# Patient Record
Sex: FEMALE | Race: WHITE | NOT HISPANIC OR LATINO | Employment: FULL TIME | URBAN - METROPOLITAN AREA
[De-identification: names, ages, dates, MRNs, and addresses within clinical notes are randomized per-mention and may not be internally consistent; named-entity substitution may affect disease eponyms.]

---

## 2017-02-21 ENCOUNTER — HOSPITAL ENCOUNTER (OUTPATIENT)
Dept: OTHER | Facility: HOSPITAL | Age: 27
Setting detail: SPECIMEN
Discharge: HOME | End: 2017-02-21
Attending: FAMILY MEDICINE | Admitting: FAMILY MEDICINE

## 2017-02-21 LAB
T3 SERPL-MCNC: 1.17 NG/ML (ref 0.87–1.78)
T4 SERPL-MCNC: 10.27 UG/DL (ref 6.1–12.2)

## 2017-07-15 ENCOUNTER — HOSPITAL ENCOUNTER (OUTPATIENT)
Dept: URGENT CARE | Facility: CLINIC | Age: 27
Discharge: HOME | End: 2017-07-15
Attending: EMERGENCY MEDICINE | Admitting: EMERGENCY MEDICINE

## 2018-08-28 ENCOUNTER — HOSPITAL ENCOUNTER (OUTPATIENT)
Dept: OTHER | Facility: HOSPITAL | Age: 28
Setting detail: SPECIMEN
Discharge: HOME | End: 2018-08-28
Attending: FAMILY MEDICINE | Admitting: FAMILY MEDICINE

## 2018-08-28 LAB
BASOPHILS # BLD AUTO: 0.1 10*3/UL (ref 0–0.2)
BASOPHILS NFR BLD AUTO: 1 % (ref 0–2)
DIFFERENTIAL METHOD BLD: (no result)
EOSINOPHIL # BLD AUTO: 0 % (ref 0–3)
EOSINOPHIL # BLD AUTO: 0 10*3/UL (ref 0–0.3)
ERYTHROCYTE [DISTWIDTH] IN BLOOD BY AUTOMATED COUNT: 14.2 % (ref 11.5–14.5)
HCT VFR BLD AUTO: 37.3 % (ref 35–49)
HGB BLD-MCNC: 12 G/DL (ref 12–15)
LYMPHOCYTES # BLD AUTO: 2 10*3/UL (ref 0.8–4.8)
LYMPHOCYTES NFR BLD AUTO: 25 % (ref 18–42)
MCH RBC QN AUTO: 28 PG (ref 26–32)
MCHC RBC AUTO-ENTMCNC: 32.3 G/DL (ref 32–36)
MCV RBC AUTO: 86.9 FL (ref 80–94)
MONOCYTES # BLD AUTO: 0.5 10*3/UL (ref 0.1–1.3)
MONOCYTES NFR BLD AUTO: 7 % (ref 2–11)
NEUTROPHILS # BLD AUTO: 5.7 10*3/UL (ref 2.3–8.6)
NEUTROPHILS NFR BLD AUTO: 67 % (ref 50–75)
NRBC BLD AUTO-RTO: 0 /100{WBCS}
NRBC/RBC NFR BLD MANUAL: 0 10*3/UL
PLATELET # BLD AUTO: (no result) 10*3/UL (ref 150–450)
PMV BLD AUTO: 9.5 FL (ref 7.4–10.4)
RBC # BLD AUTO: 4.29 10*6/UL (ref 4–5.4)
WBC # BLD AUTO: 8.3 10*3/UL (ref 4.5–11.5)

## 2019-01-21 ENCOUNTER — HOSPITAL ENCOUNTER (OUTPATIENT)
Dept: LAB | Facility: HOSPITAL | Age: 29
Discharge: HOME | End: 2019-01-21
Attending: OBSTETRICS & GYNECOLOGY | Admitting: OBSTETRICS & GYNECOLOGY

## 2019-01-21 LAB
ABO + RH BLD: NORMAL
ARMBAND: NORMAL
BASOPHILS # BLD AUTO: 0.1 10*3/UL (ref 0–0.2)
BASOPHILS NFR BLD AUTO: 1 % (ref 0–2)
BLD COMPONENT TYPE: NORMAL
BLD GP AB SCN SERPL QL: NEGATIVE
CROSSMATCH EXPIRATION: NORMAL
DIFFERENTIAL METHOD BLD: (no result)
EOSINOPHIL # BLD AUTO: 0 % (ref 0–3)
EOSINOPHIL # BLD AUTO: 0 10*3/UL (ref 0–0.3)
ERYTHROCYTE [DISTWIDTH] IN BLOOD BY AUTOMATED COUNT: 15.3 % (ref 11.5–14.5)
HCT VFR BLD AUTO: 39.3 % (ref 35–49)
HGB BLD-MCNC: 12.7 G/DL (ref 12–15)
LYMPHOCYTES # BLD AUTO: 1.8 10*3/UL (ref 0.8–4.8)
LYMPHOCYTES NFR BLD AUTO: 24 % (ref 18–42)
MCH RBC QN AUTO: 27.8 PG (ref 26–32)
MCHC RBC AUTO-ENTMCNC: 32.2 G/DL (ref 32–36)
MCV RBC AUTO: 86.3 FL (ref 80–94)
MONOCYTES # BLD AUTO: 0.4 10*3/UL (ref 0.1–1.3)
MONOCYTES NFR BLD AUTO: 6 % (ref 2–11)
NEUTROPHILS # BLD AUTO: 5.1 10*3/UL (ref 2.3–8.6)
NEUTROPHILS NFR BLD AUTO: 69 % (ref 50–75)
NRBC BLD AUTO-RTO: 0 /100{WBCS}
NRBC/RBC NFR BLD MANUAL: 0 10*3/UL
PLATELET # BLD AUTO: 258 10*3/UL (ref 150–450)
PMV BLD AUTO: 9.4 FL (ref 7.4–10.4)
RBC # BLD AUTO: 4.56 10*6/UL (ref 4–5.4)
WBC # BLD AUTO: 7.3 10*3/UL (ref 4.5–11.5)

## 2019-03-26 ENCOUNTER — HOSPITAL ENCOUNTER (OUTPATIENT)
Dept: OTHER | Facility: HOSPITAL | Age: 29
Setting detail: SPECIMEN
Discharge: HOME | End: 2019-03-26
Attending: FAMILY MEDICINE | Admitting: FAMILY MEDICINE

## 2019-03-26 LAB
ALBUMIN SERPL-MCNC: 4.3 G/DL (ref 3.5–4.8)
ALBUMIN/GLOB SERPL: 1.5 {RATIO} (ref 1–1.7)
ALP SERPL-CCNC: 61 IU/L (ref 32–91)
ALT SERPL-CCNC: 10 IU/L (ref 14–54)
ANION GAP SERPL CALC-SCNC: 14.1 MMOL/L (ref 10–20)
AST SERPL-CCNC: 17 IU/L (ref 15–41)
BASOPHILS # BLD AUTO: 0.1 10*3/UL (ref 0–0.2)
BASOPHILS NFR BLD AUTO: 1 % (ref 0–2)
BILIRUB SERPL-MCNC: 0.3 MG/DL (ref 0.3–1.2)
BUN SERPL-MCNC: 8 MG/DL (ref 8–20)
BUN/CREAT SERPL: 10 (ref 5.4–26.2)
CALCIUM SERPL-MCNC: 9 MG/DL (ref 8.9–10.3)
CHLORIDE SERPL-SCNC: 103 MMOL/L (ref 101–111)
CONV CO2: 24 MMOL/L (ref 22–32)
CONV TOTAL PROTEIN: 7.2 G/DL (ref 6.1–7.9)
CREAT UR-MCNC: 0.8 MG/DL (ref 0.4–1)
DIFFERENTIAL METHOD BLD: (no result)
EOSINOPHIL # BLD AUTO: 0 % (ref 0–3)
EOSINOPHIL # BLD AUTO: 0 10*3/UL (ref 0–0.3)
ERYTHROCYTE [DISTWIDTH] IN BLOOD BY AUTOMATED COUNT: 14.8 % (ref 11.5–14.5)
GLOBULIN UR ELPH-MCNC: 2.9 G/DL (ref 2.5–3.8)
GLUCOSE SERPL-MCNC: 183 MG/DL (ref 65–99)
HCT VFR BLD AUTO: 39.1 % (ref 35–49)
HGB BLD-MCNC: 13.1 G/DL (ref 12–15)
LYMPHOCYTES # BLD AUTO: 1.1 10*3/UL (ref 0.8–4.8)
LYMPHOCYTES NFR BLD AUTO: 16 % (ref 18–42)
MAGNESIUM SERPL-MCNC: 2 MG/DL (ref 1.8–2.5)
MCH RBC QN AUTO: 29 PG (ref 26–32)
MCHC RBC AUTO-ENTMCNC: 33.4 G/DL (ref 32–36)
MCV RBC AUTO: 87 FL (ref 80–94)
MONOCYTES # BLD AUTO: 0.3 10*3/UL (ref 0.1–1.3)
MONOCYTES NFR BLD AUTO: 5 % (ref 2–11)
NEUTROPHILS # BLD AUTO: 5.5 10*3/UL (ref 2.3–8.6)
NEUTROPHILS NFR BLD AUTO: 78 % (ref 50–75)
NRBC BLD AUTO-RTO: 0 /100{WBCS}
NRBC/RBC NFR BLD MANUAL: 0 10*3/UL
PLATELET # BLD AUTO: 260 10*3/UL (ref 150–450)
PMV BLD AUTO: 9.2 FL (ref 7.4–10.4)
POTASSIUM SERPL-SCNC: 3.1 MMOL/L (ref 3.6–5.1)
RBC # BLD AUTO: 4.5 10*6/UL (ref 4–5.4)
SODIUM SERPL-SCNC: 138 MMOL/L (ref 136–144)
WBC # BLD AUTO: 7 10*3/UL (ref 4.5–11.5)

## 2019-05-28 ENCOUNTER — CONVERSION ENCOUNTER (OUTPATIENT)
Dept: OTHER | Facility: HOSPITAL | Age: 29
End: 2019-05-28

## 2019-05-28 ENCOUNTER — HOSPITAL ENCOUNTER (OUTPATIENT)
Dept: URGENT CARE | Facility: CLINIC | Age: 29
Setting detail: SPECIMEN
Discharge: HOME | End: 2019-05-28
Attending: FAMILY MEDICINE | Admitting: FAMILY MEDICINE

## 2019-05-28 LAB
BACTERIA SPEC AEROBE CULT: NORMAL
Lab: NORMAL
MICRO REPORT STATUS: NORMAL
SPECIMEN SOURCE: NORMAL

## 2019-06-04 VITALS
HEART RATE: 78 BPM | SYSTOLIC BLOOD PRESSURE: 107 MMHG | BODY MASS INDEX: 25.51 KG/M2 | WEIGHT: 135 LBS | DIASTOLIC BLOOD PRESSURE: 74 MMHG | RESPIRATION RATE: 20 BRPM | OXYGEN SATURATION: 99 %

## 2019-06-06 NOTE — PROGRESS NOTES
Left flank pain and urinary frequency    28-year-old female presents with complaints of pain in her left flank and urinary frequency.  Symptoms started on Saturday at which time she noticed relatively severe pain in her left flank area.  With that she had nausea but no vomiting.  The pain was   severe for a while and then seemed to ease off.  Since that time she has continued to have pain in her flank but no nausea or vomiting.  The pain has radiated minimally into her lower left side.  No history of renal  Calculi.  No hematuria.    Vital Signs:    Patient Profile:    28 Years Old Female  LMP:        2019  Height:     61 inches  Weight:     135 pounds  BMI:        25.51     O2 Sat:     99 %  Temp:       98.2 degrees F oral  Pulse rate: 78 / minute  Resp:       20 per minute  BP Sittin / 74  (left arm)    Cuff size:  large    Medications: Medications were reviewed with the patient during this visit.  Allergies: Allergies were reviewed with the patient during this visit.  No Known Allergy.    Last MP: 2019      Current Allergies (reviewed today):  No known allergies      Past Medical History:     Reviewed history from 2012 and no changes required:        Hypothyroidism        Arthritis    Past Surgical History:     Reviewed history from 2019 and no changes required:        tubal (2019)    Family History Summary:      Reviewed history Last on 2019 and no changes required:2019  PGF - Has Family History of Heart Disease - Entered On: 2016  PGF - Has Family History of Diabetes - Entered On: 2016  MGM - Has Family History of Heart Disease - Entered On: 2016  MGM - Has Family History of Diabetes - Entered On: 2016    General Comments - FH:  FH-PGM Alzheimers    Social History:     Reviewed history from 07/15/2017 and no changes required:                employed at [employer].                Smoking History:        Patient is a former smoker.         Patient has been counseled to quit.        Risk Factors:     Smoked Tobacco Use:  Former smoker     Cigarettes:  Yes -- 1/2 pack(s) per day,      Year quit:  2015        Years Since Last Quit:  4   Smokeless Tobacco Use:  Never  Passive smoke exposure:  no  Drug use:  no  HIV high-risk behavior:  no  Caffeine use:  0 drinks per day  Alcohol use:  no  Exercise:  no  Seatbelt use:  100 %  Sun Exposure:  occasionally      Review of Systems   General: Denies fevers, chills.   Gastrointestinal: Complains of nausea. Denies vomiting.   Genitourinary: Complains of urinary frequency. Denies incontinence, dysuria, hematuria.         Physical Exam    General:      well developed, well nourished, in no acute distress.    Neck:      no masses, thyromegaly, or abnormal cervical nodes.    Lungs:      clear bilaterally to auscultation.    Heart:      regular rhythm and no murmurs.    Abdomen:        Bowel sounds present, soft, nontender, no HSM or mass, mild left CVA tenderness      Blood Pressure:  Today's BP: 107/74 mm Hg    Labwork:   Most Recent Lab Results:   LDL: 94 mg/dL 02/25/2016        Impression & Recommendations:    Problem # 1:  Flank pain, left (ICD-789.09) ([id number])    Orders:  [facility] V[facility], Est Level III ([id number])   I discussed with this patient her left flank pain.  We discussed the possibility of renal calculi.  Recommended that she strain her urine.  Recommend that she set up an appointment to see her primary care physician.  Recommended for worsening pain to go   to the emergency room for further evaluation.    Other Orders:  Urinalysis Dip Stick/Tablet Rgnt AUTO W/O Jt (44523)  [facility] CULTURE,URINE ([facility])      Patient Instructions:  1)  The exam and treatment that you have received at the Urgent Care has been rendered on an urgent or immediate care basis only and is not intended to to be a substitute for your primary care doctor. It is important that you report any persistant or   worsening problems and see your  physician for follow up care. It is impossible to recognize and treat all elements of an injury or illness in a single visit. If you are unable to contact your physician please call or return to the Urgent Care Center.  2)  Schedule an appointment with  your primary care doctor.  3)  Go to the emergency room for worsening pain.      ]          Laceration/ Wound   Last Tetanus: TDAP (Adacel) WV06.1 (07/15/2017 9:52:31 AM)    Objective     cm  Assessment:     Plan:     Technician: Vanda ospina    Date/Time Collected: May 28, 2019 1:34 PM)  Date/Time Received: May 28, 2019 1:34 PM)  Performed by: ESTHER    Routine Urinalysis          Normal Range   Color:      yellow          Color: Yellow   Appearance:  clear          Appearance: Clear  Leukocytes:  negative       Leukocytes: Negative   Nitrite:         negative       Nitrite: Negative  Protein:     trace mg/dL        Protein:  Negative mg/dL  pH:      6.0        pH:  4.5-8.0  Blood:       negative       Blood:  Negative  Spec. Gravity:   1.030      Spec Gravity:  1.005-1.030  Ketones:     negative mg/dL     Ketones:  Negative mg/dL  Bilirubin:   negative       Bilirubin:  Negative  Glucose:     negative mg/dL     Glucose:  Negative mg/dL                      Electronically signed by Hussein Simmons MD on 05/28/2019 at 1:59 PM  ________________________________________________________________________       Disclaimer: Converted Note message may not contain all data elements that existed in the [employer] source system. Please see [employer] System for the original note details.

## 2019-06-24 ENCOUNTER — OFFICE VISIT (OUTPATIENT)
Dept: FAMILY MEDICINE CLINIC | Facility: CLINIC | Age: 29
End: 2019-06-24

## 2019-06-24 VITALS
WEIGHT: 136 LBS | BODY MASS INDEX: 25.68 KG/M2 | SYSTOLIC BLOOD PRESSURE: 100 MMHG | DIASTOLIC BLOOD PRESSURE: 70 MMHG | HEART RATE: 76 BPM | OXYGEN SATURATION: 98 % | HEIGHT: 61 IN

## 2019-06-24 DIAGNOSIS — E03.8 HYPOTHYROIDISM DUE TO HASHIMOTO'S THYROIDITIS: ICD-10-CM

## 2019-06-24 DIAGNOSIS — R73.9 HYPERGLYCEMIA: ICD-10-CM

## 2019-06-24 DIAGNOSIS — F41.8 ANXIETY WITH DEPRESSION: Primary | ICD-10-CM

## 2019-06-24 DIAGNOSIS — E06.3 HYPOTHYROIDISM DUE TO HASHIMOTO'S THYROIDITIS: ICD-10-CM

## 2019-06-24 PROCEDURE — 99213 OFFICE O/P EST LOW 20 MIN: CPT | Performed by: FAMILY MEDICINE

## 2019-06-24 RX ORDER — BUPROPION HYDROCHLORIDE 150 MG/1
150 TABLET ORAL EVERY MORNING
Qty: 90 TABLET | Refills: 1 | Status: SHIPPED | OUTPATIENT
Start: 2019-06-24 | End: 2020-02-07 | Stop reason: SDUPTHER

## 2019-06-24 RX ORDER — LEVOTHYROXINE SODIUM 0.12 MG/1
125 TABLET ORAL DAILY
Qty: 90 TABLET | Refills: 1 | Status: SHIPPED | OUTPATIENT
Start: 2019-06-24 | End: 2020-02-07

## 2019-06-25 PROBLEM — R53.83 FATIGUE: Status: ACTIVE | Noted: 2018-08-28

## 2019-06-25 PROBLEM — R73.9 HYPERGLYCEMIA: Status: ACTIVE | Noted: 2019-03-27

## 2019-06-25 PROBLEM — E03.9 HYPOTHYROIDISM: Status: ACTIVE | Noted: 2019-06-25

## 2019-06-25 NOTE — PATIENT INSTRUCTIONS
Labs and Meds reviewed.  BS elevation noted. Begin DM Diet and Exercise w Cionnamon 500 mg before am and pm meals.   Sample Freestyle Meter given w test of am and pm BS q.o.d. X 5 and review. Ck A1c w next Lab draw.  Further F/U prn or ion 3 mo.

## 2019-06-25 NOTE — PROGRESS NOTES
"Aryan Campos is a 28 y.o. female.     Pt in for F/U chronic anxiety and depression along w Hypothyroidism and elevation of BS on recent Labs.  FH Pos for DM on both sides.  Some lingering Fatigue noted.     /70 (BP Location: Left arm, Patient Position: Sitting, Cuff Size: Adult)   Pulse 76   Ht 154.9 cm (60.98\")   Wt 61.7 kg (136 lb)   LMP 06/13/2019 (Approximate)   SpO2 98%   BMI 25.71 kg/m²     The following portions of the patient's history were reviewed and updated as appropriate: allergies, current medications, past medical history, past social history and problem list.    Review of Systems   Constitutional: Positive for fatigue.   HENT: Negative.    Eyes: Negative.    Respiratory: Negative.    Cardiovascular: Negative.    Gastrointestinal: Negative.    Endocrine: Negative.         Recent Elevation of BS.   Neurological: Negative.    Psychiatric/Behavioral: Positive for dysphoric mood. The patient is nervous/anxious.         Improved.       Objective   Physical Exam   Constitutional: She is oriented to person, place, and time. She appears well-developed and well-nourished.   HENT:   Head: Atraumatic.   Mouth/Throat: Oropharynx is clear and moist.   Eyes: Conjunctivae and EOM are normal. Pupils are equal, round, and reactive to light.   Neck: No thyromegaly present.   Cardiovascular: Normal rate, regular rhythm, normal heart sounds and intact distal pulses.   No murmur heard.  Pulmonary/Chest: Effort normal and breath sounds normal. No respiratory distress. She has no wheezes. She exhibits no tenderness.   Abdominal: Soft. Bowel sounds are normal. She exhibits no mass. There is no tenderness.   Lymphadenopathy:     She has no cervical adenopathy.   Neurological: She is alert and oriented to person, place, and time. No cranial nerve deficit or sensory deficit. She exhibits normal muscle tone.   Skin: Skin is dry. No rash noted.   Psychiatric: She has a normal mood and affect. Her " behavior is normal. Judgment and thought content normal.   Nursing note and vitals reviewed.      Assessment/Plan  Brigida was seen today for 3 month f/u and med refill.    Diagnoses and all orders for this visit:    Anxiety with depression    Hypothyroidism due to Hashimoto's thyroiditis    Hyperglycemia    Other orders  -     buPROPion XL (WELLBUTRIN XL) 150 MG 24 hr tablet; Take 1 tablet by mouth Every Morning.  -     levothyroxine (SYNTHROID, LEVOTHROID) 125 MCG tablet; Take 1 tablet by mouth Daily.

## 2020-01-07 ENCOUNTER — OFFICE VISIT (OUTPATIENT)
Dept: FAMILY MEDICINE CLINIC | Facility: CLINIC | Age: 30
End: 2020-01-07

## 2020-01-07 VITALS
OXYGEN SATURATION: 97 % | BODY MASS INDEX: 26.62 KG/M2 | SYSTOLIC BLOOD PRESSURE: 111 MMHG | DIASTOLIC BLOOD PRESSURE: 74 MMHG | TEMPERATURE: 98.6 F | WEIGHT: 141 LBS | HEART RATE: 80 BPM | HEIGHT: 61 IN

## 2020-01-07 DIAGNOSIS — B37.31 VAGINAL CANDIDA: ICD-10-CM

## 2020-01-07 DIAGNOSIS — J01.80 OTHER ACUTE SINUSITIS, RECURRENCE NOT SPECIFIED: ICD-10-CM

## 2020-01-07 DIAGNOSIS — J40 BRONCHITIS: Primary | ICD-10-CM

## 2020-01-07 PROCEDURE — 99213 OFFICE O/P EST LOW 20 MIN: CPT | Performed by: NURSE PRACTITIONER

## 2020-01-07 RX ORDER — METHYLPREDNISOLONE 4 MG/1
TABLET ORAL
Qty: 21 TABLET | Refills: 0 | Status: SHIPPED | OUTPATIENT
Start: 2020-01-07 | End: 2020-02-05

## 2020-01-07 RX ORDER — FLUCONAZOLE 150 MG/1
TABLET ORAL
Qty: 5 TABLET | Refills: 0 | Status: SHIPPED | OUTPATIENT
Start: 2020-01-07 | End: 2020-02-05

## 2020-01-07 RX ORDER — AZITHROMYCIN 250 MG/1
TABLET, FILM COATED ORAL
Qty: 6 TABLET | Refills: 0 | Status: SHIPPED | OUTPATIENT
Start: 2020-01-07 | End: 2020-02-05

## 2020-01-07 RX ORDER — BENZONATATE 100 MG/1
100 CAPSULE ORAL 3 TIMES DAILY PRN
Qty: 30 CAPSULE | Refills: 0 | Status: SHIPPED | OUTPATIENT
Start: 2020-01-07 | End: 2020-02-05

## 2020-01-07 NOTE — PROGRESS NOTES
"Brigida Campos is a 29 y.o. female.     Chief Complaint   Patient presents with   • Cough   • Nasal Congestion       History of Present Illness     Patient presents for cough w/ productive yellow sputum, wheezing, nasal congestion, sinus pressure and ear pain/fullness going on for approximately 1.5 weeks.  She reports her 1-year-old daughter had RSV she also developed symptoms, was seen in the [facility] clinic treated symptomatically however her symptoms are now worse and no improvement with over-the-counter medications.  Mucinex fast max and DayQuil cause dizziness and lightheadedness, she had four amoxicillin tablets left from a previous prescription and took those but they did not help, she also complains of vaginal yeast infection.       Subjective     Visit Vitals  /74 (BP Location: Left arm, Patient Position: Sitting, Cuff Size: Adult)   Pulse 80   Temp 98.6 °F (37 °C) (Oral)   Ht 154.9 cm (60.98\")   Wt 64 kg (141 lb)   SpO2 97%   BMI 26.66 kg/m²       The following portions of the patient's history were reviewed and updated as appropriate: allergies, current medications, past family history, past medical history, past social history, past surgical history and problem list.    Review of Systems   Constitutional: Positive for chills and fatigue. Negative for fever.   HENT: Positive for congestion, ear pain, postnasal drip, rhinorrhea, sinus pressure, sneezing and sore throat.    Respiratory: Positive for cough and wheezing. Negative for shortness of breath.    Cardiovascular: Negative for chest pain, palpitations and leg swelling.   Gastrointestinal: Negative.    Musculoskeletal: Negative.    Psychiatric/Behavioral: Negative.        Objective     Physical Exam   Constitutional: She is oriented to person, place, and time. She appears well-developed and well-nourished. She appears ill. No distress.   HENT:   Head: Normocephalic.   Nose: Rhinorrhea and congestion present.   Eyes: Pupils are equal, round, " and reactive to light. Conjunctivae are normal.   Neck: Normal range of motion. Neck supple. No JVD present. No thyromegaly present.   Cardiovascular: Normal rate, regular rhythm and normal heart sounds.   No murmur heard.  Pulmonary/Chest: Effort normal. No respiratory distress. She has wheezes ( With rhonchi throughout). She has no rales.   Abdominal: Soft. Bowel sounds are normal. She exhibits no distension. There is no tenderness.   Musculoskeletal: Normal range of motion. She exhibits no edema or tenderness.   Neurological: She is alert and oriented to person, place, and time. No sensory deficit.   Skin: Skin is warm and dry. No rash noted. She is not diaphoretic. No erythema.   Psychiatric: She has a normal mood and affect. Her behavior is normal. Judgment and thought content normal.         Assessment/Plan   Brigida was seen today for cough and nasal congestion.    Diagnoses and all orders for this visit:    Bronchitis    Other acute sinusitis, recurrence not specified    Vaginal candida    Other orders  -     azithromycin (ZITHROMAX) 250 MG tablet; Take 2 tablets the first day, then 1 tablet daily for 4 days.  -     methylPREDNISolone (MEDROL, LYUDMILA,) 4 MG tablet; Take as directed on package instructions.  -     benzonatate (TESSALON PERLES) 100 MG capsule; Take 1 capsule by mouth 3 (Three) Times a Day As Needed for Cough.  -     fluconazole (DIFLUCAN) 150 MG tablet; Take 1 tablet every 72 hours if needed for yeast infection      Likely post viral infection, Start Z-Lyudmila, Medrol Dosepak and Tessalon Perles.  Diflucan if needed.  Get Mucinex DM over-the-counter avoid products with phenylephrine due to patient's dizziness and lightheadedness.  Push fluids, call or return in 3 to 4 days if signs and symptoms are worse or no better  Return to office in approximately 4 weeks for chronic conditions, lab work and medication refills           Glucose   Date Value Ref Range Status   03/26/2019 183 (H) 65 - 99 mg/dL Final      BUN   Date Value Ref Range Status   03/26/2019 8 8 - 20 mg/dL Final     Creatinine   Date Value Ref Range Status   03/26/2019 0.8 0.4 - 1.0 mg/dl Final     Sodium   Date Value Ref Range Status   03/26/2019 138 136 - 144 mmol/L Final     Potassium   Date Value Ref Range Status   03/26/2019 3.1 (L) 3.6 - 5.1 mmol/L Final     Chloride   Date Value Ref Range Status   03/26/2019 103 101 - 111 mmol/L Final     CO2   Date Value Ref Range Status   03/26/2019 24 22 - 32 mmol/L Final     Calcium   Date Value Ref Range Status   03/26/2019 9.0 8.9 - 10.3 mg/dL Final     Total Protein   Date Value Ref Range Status   03/26/2019 7.2 6.1 - 7.9 g/dL Final     Albumin   Date Value Ref Range Status   03/26/2019 4.3 3.5 - 4.8 g/dL Final     ALT (SGPT)   Date Value Ref Range Status   03/26/2019 10 (L) 14 - 54 IU/L Final     AST (SGOT)   Date Value Ref Range Status   03/26/2019 17 15 - 41 IU/L Final     Alkaline Phosphatase   Date Value Ref Range Status   03/26/2019 61 32 - 91 IU/L Final     Total Bilirubin   Date Value Ref Range Status   03/26/2019 0.3 0.3 - 1.2 mg/dL Final     A/G Ratio   Date Value Ref Range Status   03/26/2019 1.5 1.0 - 1.7 Final     BUN/Creatinine Ratio   Date Value Ref Range Status   03/26/2019 10.0 5.4 - 26.2 Final     Anion Gap   Date Value Ref Range Status   03/26/2019 14.1 10 - 20 Final

## 2020-02-05 ENCOUNTER — OFFICE VISIT (OUTPATIENT)
Dept: FAMILY MEDICINE CLINIC | Facility: CLINIC | Age: 30
End: 2020-02-05

## 2020-02-05 VITALS
WEIGHT: 143 LBS | HEIGHT: 61 IN | OXYGEN SATURATION: 100 % | BODY MASS INDEX: 27 KG/M2 | HEART RATE: 68 BPM | DIASTOLIC BLOOD PRESSURE: 69 MMHG | SYSTOLIC BLOOD PRESSURE: 103 MMHG

## 2020-02-05 DIAGNOSIS — J45.30 MILD PERSISTENT ASTHMATIC BRONCHITIS WITHOUT COMPLICATION: ICD-10-CM

## 2020-02-05 DIAGNOSIS — R73.09 ABNORMAL GLUCOSE: ICD-10-CM

## 2020-02-05 DIAGNOSIS — F41.9 ANXIETY: ICD-10-CM

## 2020-02-05 DIAGNOSIS — F32.A DEPRESSION, UNSPECIFIED DEPRESSION TYPE: ICD-10-CM

## 2020-02-05 DIAGNOSIS — E89.0 POSTABLATIVE HYPOTHYROIDISM: Primary | ICD-10-CM

## 2020-02-05 DIAGNOSIS — Z72.0 TOBACCO USE: ICD-10-CM

## 2020-02-05 PROBLEM — J45.909 ASTHMATIC BRONCHITIS: Status: ACTIVE | Noted: 2020-02-05

## 2020-02-05 PROCEDURE — 84443 ASSAY THYROID STIM HORMONE: CPT | Performed by: NURSE PRACTITIONER

## 2020-02-05 PROCEDURE — 80053 COMPREHEN METABOLIC PANEL: CPT | Performed by: NURSE PRACTITIONER

## 2020-02-05 PROCEDURE — 99214 OFFICE O/P EST MOD 30 MIN: CPT | Performed by: NURSE PRACTITIONER

## 2020-02-05 PROCEDURE — 85027 COMPLETE CBC AUTOMATED: CPT | Performed by: NURSE PRACTITIONER

## 2020-02-05 PROCEDURE — 83036 HEMOGLOBIN GLYCOSYLATED A1C: CPT | Performed by: NURSE PRACTITIONER

## 2020-02-05 PROCEDURE — 80061 LIPID PANEL: CPT | Performed by: NURSE PRACTITIONER

## 2020-02-05 NOTE — PROGRESS NOTES
"  Brigida Campos is a 29 y.o. female.     Chief Complaint   Patient presents with   • Hypothyroidism     Needs thyroid level checked.       History of Present Illness     Hypothyroidism/Graves dz, history of radiation treatment at age 18, now on levothyroxine and stable, same dose 125 for many years.     Asthmatic bronchitis, completed multiple rounds of antibiotics and steroids, condition has improved however still coughing with wheeze and worse at night.  Patient is a smoker but has cut back to only 2 cigarettes/day, with intention to quit in the future    Anxiety, stable on Wellbutrin, does not need refill at this time    Subjective     Visit Vitals  /69 (BP Location: Left arm, Patient Position: Sitting, Cuff Size: Adult)   Pulse 68   Ht 154.9 cm (60.98\")   Wt 64.9 kg (143 lb)   SpO2 100%   BMI 27.03 kg/m²       The following portions of the patient's history were reviewed and updated as appropriate: allergies, current medications, past family history, past medical history, past social history, past surgical history and problem list.    Review of Systems   Constitutional: Negative for chills, fatigue and fever.   HENT: Negative for dental problem, ear pain, sinus pressure and sore throat.    Eyes: Negative for visual disturbance.   Respiratory: Positive for cough and wheezing. Negative for shortness of breath.    Cardiovascular: Negative for chest pain, palpitations and leg swelling.   Gastrointestinal: Negative for abdominal pain, blood in stool, constipation, diarrhea, nausea, vomiting and GERD.   Genitourinary: Negative for difficulty urinating, frequency, urgency and urinary incontinence.   Musculoskeletal: Negative for arthralgias, back pain, gait problem, joint swelling, myalgias and neck pain.   Skin: Negative for dry skin, pallor and rash.   Neurological: Negative for dizziness, seizures, speech difficulty and weakness.   Hematological: Negative for adenopathy.   Psychiatric/Behavioral: " Negative for sleep disturbance, depressed mood and stress. The patient is nervous/anxious.        Objective     Physical Exam   Constitutional: She is oriented to person, place, and time. She appears well-developed and well-nourished. No distress.   HENT:   Head: Normocephalic.   Eyes: Pupils are equal, round, and reactive to light. Conjunctivae are normal.   Neck: Normal range of motion. Neck supple. No JVD present. No thyromegaly present.   Cardiovascular: Normal rate, regular rhythm and normal heart sounds.   No murmur heard.  Pulmonary/Chest: Effort normal. No respiratory distress. She has wheezes.   Tight, dry nonproductive cough   Abdominal: Soft. Bowel sounds are normal. She exhibits no distension. There is no tenderness.   Musculoskeletal: Normal range of motion. She exhibits no edema or tenderness.   Neurological: She is alert and oriented to person, place, and time. No sensory deficit.   Skin: Skin is warm and dry. No rash noted. She is not diaphoretic. No erythema.   Psychiatric: She has a normal mood and affect. Her behavior is normal. Judgment normal.   Nursing note and vitals reviewed.        Assessment/Plan   Brigida was seen today for hypothyroidism.    Diagnoses and all orders for this visit:    Postablative hypothyroidism  -     Comprehensive Metabolic Panel  -     CBC (No Diff)  -     Lipid Panel  -     TSH  Check labs as above, patient does not need levothyroxine refilled at this time.  Will notify patient results    Abnormal glucose  -     Hemoglobin A1c  Last fasting glucose 10 months ago 180, check A1c.  Patient saw endocrinology in the past    Anxiety  Stable on Wellbutrin, refill    Depression, unspecified depression type  Stable, refill Wellbutrin    Tobacco use  Praised efforts at cutting back to only 2 cigarettes/day, continue to cut back and quit    Asthmatic bronchitis  Trial Symbicort inhaler 1 puff twice daily for 30 days, quit smoking      Pap UTD  Declines flu shot.    Encouraged  exercise, walking at least 3 to 4 days/week, patient has already improved diet habits  We will see patient back in 6 months or earlier as needed               Glucose   Date Value Ref Range Status   03/26/2019 183 (H) 65 - 99 mg/dL Final     BUN   Date Value Ref Range Status   03/26/2019 8 8 - 20 mg/dL Final     Creatinine   Date Value Ref Range Status   03/26/2019 0.8 0.4 - 1.0 mg/dl Final     Sodium   Date Value Ref Range Status   03/26/2019 138 136 - 144 mmol/L Final     Potassium   Date Value Ref Range Status   03/26/2019 3.1 (L) 3.6 - 5.1 mmol/L Final     Chloride   Date Value Ref Range Status   03/26/2019 103 101 - 111 mmol/L Final     CO2   Date Value Ref Range Status   03/26/2019 24 22 - 32 mmol/L Final     Calcium   Date Value Ref Range Status   03/26/2019 9.0 8.9 - 10.3 mg/dL Final     Total Protein   Date Value Ref Range Status   03/26/2019 7.2 6.1 - 7.9 g/dL Final     Albumin   Date Value Ref Range Status   03/26/2019 4.3 3.5 - 4.8 g/dL Final     ALT (SGPT)   Date Value Ref Range Status   03/26/2019 10 (L) 14 - 54 IU/L Final     AST (SGOT)   Date Value Ref Range Status   03/26/2019 17 15 - 41 IU/L Final     Alkaline Phosphatase   Date Value Ref Range Status   03/26/2019 61 32 - 91 IU/L Final     Total Bilirubin   Date Value Ref Range Status   03/26/2019 0.3 0.3 - 1.2 mg/dL Final     A/G Ratio   Date Value Ref Range Status   03/26/2019 1.5 1.0 - 1.7 Final     BUN/Creatinine Ratio   Date Value Ref Range Status   03/26/2019 10.0 5.4 - 26.2 Final     Anion Gap   Date Value Ref Range Status   03/26/2019 14.1 10 - 20 Final

## 2020-02-06 LAB
ALBUMIN SERPL-MCNC: 4.2 G/DL (ref 3.5–5.2)
ALBUMIN/GLOB SERPL: 1.4 G/DL
ALP SERPL-CCNC: 47 U/L (ref 39–117)
ALT SERPL W P-5'-P-CCNC: 10 U/L (ref 1–33)
ANION GAP SERPL CALCULATED.3IONS-SCNC: 13.6 MMOL/L (ref 5–15)
AST SERPL-CCNC: 11 U/L (ref 1–32)
BILIRUB SERPL-MCNC: 0.3 MG/DL (ref 0.2–1.2)
BUN BLD-MCNC: 9 MG/DL (ref 6–20)
BUN/CREAT SERPL: 11.5 (ref 7–25)
CALCIUM SPEC-SCNC: 9.5 MG/DL (ref 8.6–10.5)
CHLORIDE SERPL-SCNC: 104 MMOL/L (ref 98–107)
CHOLEST SERPL-MCNC: 167 MG/DL (ref 0–200)
CO2 SERPL-SCNC: 23.4 MMOL/L (ref 22–29)
CREAT BLD-MCNC: 0.78 MG/DL (ref 0.57–1)
DEPRECATED RDW RBC AUTO: 39.4 FL (ref 37–54)
ERYTHROCYTE [DISTWIDTH] IN BLOOD BY AUTOMATED COUNT: 12.2 % (ref 12.3–15.4)
GFR SERPL CREATININE-BSD FRML MDRD: 86 ML/MIN/1.73
GLOBULIN UR ELPH-MCNC: 2.9 GM/DL
GLUCOSE BLD-MCNC: 62 MG/DL (ref 65–99)
HBA1C MFR BLD: 5 % (ref 3.5–5.6)
HCT VFR BLD AUTO: 40.3 % (ref 34–46.6)
HDLC SERPL-MCNC: 50 MG/DL (ref 40–60)
HGB BLD-MCNC: 13.2 G/DL (ref 12–15.9)
LDLC SERPL CALC-MCNC: 100 MG/DL (ref 0–100)
LDLC/HDLC SERPL: 2 {RATIO}
MCH RBC QN AUTO: 28.8 PG (ref 26.6–33)
MCHC RBC AUTO-ENTMCNC: 32.8 G/DL (ref 31.5–35.7)
MCV RBC AUTO: 88 FL (ref 79–97)
PLATELET # BLD AUTO: 289 10*3/MM3 (ref 140–450)
PMV BLD AUTO: 11 FL (ref 6–12)
POTASSIUM BLD-SCNC: 3.8 MMOL/L (ref 3.5–5.2)
PROT SERPL-MCNC: 7.1 G/DL (ref 6–8.5)
RBC # BLD AUTO: 4.58 10*6/MM3 (ref 3.77–5.28)
SODIUM BLD-SCNC: 141 MMOL/L (ref 136–145)
TRIGL SERPL-MCNC: 86 MG/DL (ref 0–150)
TSH SERPL DL<=0.05 MIU/L-ACNC: 6.85 UIU/ML (ref 0.27–4.2)
VLDLC SERPL-MCNC: 17.2 MG/DL (ref 5–40)
WBC NRBC COR # BLD: 8.65 10*3/MM3 (ref 3.4–10.8)

## 2020-02-07 DIAGNOSIS — E89.0 POSTABLATIVE HYPOTHYROIDISM: Primary | ICD-10-CM

## 2020-02-07 RX ORDER — BUPROPION HYDROCHLORIDE 150 MG/1
150 TABLET ORAL EVERY MORNING
Qty: 90 TABLET | Refills: 1 | Status: SHIPPED | OUTPATIENT
Start: 2020-02-07 | End: 2020-06-22 | Stop reason: SDUPTHER

## 2020-02-07 RX ORDER — LEVOTHYROXINE SODIUM 137 UG/1
137 CAPSULE ORAL DAILY
Qty: 30 CAPSULE | Refills: 1 | Status: SHIPPED | OUTPATIENT
Start: 2020-02-07 | End: 2020-02-11

## 2020-02-11 RX ORDER — LEVOTHYROXINE SODIUM 137 UG/1
137 TABLET ORAL DAILY
Qty: 30 TABLET | Refills: 1 | Status: SHIPPED | OUTPATIENT
Start: 2020-02-11 | End: 2020-04-07

## 2020-02-11 NOTE — PROGRESS NOTES
Patient notified of results. She said the brand you sent of the thyroid medication is not covered and cost her $50. She wants to know if you can send the same brand she was getting before- not the Tirosint.

## 2020-03-30 ENCOUNTER — LAB (OUTPATIENT)
Dept: FAMILY MEDICINE CLINIC | Facility: CLINIC | Age: 30
End: 2020-03-30

## 2020-03-30 DIAGNOSIS — E89.0 POSTABLATIVE HYPOTHYROIDISM: ICD-10-CM

## 2020-03-30 PROCEDURE — 84443 ASSAY THYROID STIM HORMONE: CPT | Performed by: NURSE PRACTITIONER

## 2020-03-31 LAB — TSH SERPL DL<=0.05 MIU/L-ACNC: 1.05 UIU/ML (ref 0.27–4.2)

## 2020-04-07 RX ORDER — LEVOTHYROXINE SODIUM 137 UG/1
137 TABLET ORAL DAILY
Qty: 30 TABLET | Refills: 1 | Status: SHIPPED | OUTPATIENT
Start: 2020-04-07 | End: 2020-06-16

## 2020-06-16 RX ORDER — LEVOTHYROXINE SODIUM 137 UG/1
137 TABLET ORAL DAILY
Qty: 30 TABLET | Refills: 1 | Status: SHIPPED | OUTPATIENT
Start: 2020-06-16 | End: 2020-06-22 | Stop reason: SDUPTHER

## 2020-06-22 RX ORDER — BUPROPION HYDROCHLORIDE 150 MG/1
150 TABLET ORAL EVERY MORNING
Qty: 90 TABLET | Refills: 1 | Status: SHIPPED | OUTPATIENT
Start: 2020-06-22 | End: 2021-06-23 | Stop reason: SDUPTHER

## 2020-06-22 RX ORDER — LEVOTHYROXINE SODIUM 137 UG/1
137 TABLET ORAL DAILY
Qty: 90 TABLET | Refills: 1 | Status: SHIPPED | OUTPATIENT
Start: 2020-06-22 | End: 2020-08-28 | Stop reason: SDUPTHER

## 2020-08-28 RX ORDER — LEVOTHYROXINE SODIUM 137 UG/1
137 TABLET ORAL DAILY
Qty: 90 TABLET | Refills: 1 | Status: SHIPPED | OUTPATIENT
Start: 2020-08-28 | End: 2021-04-21

## 2021-04-12 ENCOUNTER — OFFICE VISIT (OUTPATIENT)
Dept: FAMILY MEDICINE CLINIC | Facility: CLINIC | Age: 31
End: 2021-04-12

## 2021-04-12 VITALS
TEMPERATURE: 98.4 F | BODY MASS INDEX: 27.15 KG/M2 | HEART RATE: 75 BPM | HEIGHT: 61 IN | OXYGEN SATURATION: 89 % | WEIGHT: 143.8 LBS | RESPIRATION RATE: 18 BRPM | SYSTOLIC BLOOD PRESSURE: 103 MMHG | DIASTOLIC BLOOD PRESSURE: 68 MMHG

## 2021-04-12 DIAGNOSIS — K58.0 IRRITABLE BOWEL SYNDROME WITH DIARRHEA: ICD-10-CM

## 2021-04-12 DIAGNOSIS — E03.8 HYPOTHYROIDISM DUE TO HASHIMOTO'S THYROIDITIS: ICD-10-CM

## 2021-04-12 DIAGNOSIS — E89.0 POSTABLATIVE HYPOTHYROIDISM: Primary | ICD-10-CM

## 2021-04-12 DIAGNOSIS — E06.3 HYPOTHYROIDISM DUE TO HASHIMOTO'S THYROIDITIS: ICD-10-CM

## 2021-04-12 DIAGNOSIS — R53.82 CHRONIC FATIGUE: ICD-10-CM

## 2021-04-12 DIAGNOSIS — M19.90 ARTHRITIS: ICD-10-CM

## 2021-04-12 PROBLEM — N76.0 VAGINITIS: Status: ACTIVE | Noted: 2021-04-12

## 2021-04-12 PROBLEM — E03.9 HYPOTHYROIDISM: Status: ACTIVE | Noted: 2021-04-12

## 2021-04-12 PROBLEM — R60.9 EDEMA: Status: ACTIVE | Noted: 2021-04-12

## 2021-04-12 PROBLEM — F41.9 ANXIETY: Status: ACTIVE | Noted: 2021-04-12

## 2021-04-12 PROBLEM — J02.9 SORE THROAT: Status: ACTIVE | Noted: 2021-04-12

## 2021-04-12 PROBLEM — B37.9 YEAST INFECTION: Status: ACTIVE | Noted: 2021-04-12

## 2021-04-12 PROBLEM — E05.00 GRAVES' DISEASE: Status: ACTIVE | Noted: 2021-04-12

## 2021-04-12 PROBLEM — J01.90 ACUTE SINUSITIS: Status: ACTIVE | Noted: 2021-04-12

## 2021-04-12 PROBLEM — J06.9 UPPER RESPIRATORY INFECTION: Status: ACTIVE | Noted: 2021-04-12

## 2021-04-12 PROBLEM — M79.10 MYALGIA: Status: ACTIVE | Noted: 2021-04-12

## 2021-04-12 PROBLEM — E66.3 OVERWEIGHT: Status: ACTIVE | Noted: 2021-04-12

## 2021-04-12 PROBLEM — R79.82 CRP ELEVATED: Status: ACTIVE | Noted: 2021-04-12

## 2021-04-12 PROCEDURE — 99204 OFFICE O/P NEW MOD 45 MIN: CPT | Performed by: FAMILY MEDICINE

## 2021-04-12 RX ORDER — DICYCLOMINE HYDROCHLORIDE 10 MG/1
10 CAPSULE ORAL 3 TIMES DAILY PRN
Qty: 90 CAPSULE | Refills: 2 | Status: SHIPPED | OUTPATIENT
Start: 2021-04-12

## 2021-04-12 NOTE — PROGRESS NOTES
"Chief Complaint  Thyroid Problem (patient wants to recheck her thyroid levels)    Subjective    History of Present Illness        Brigida Campos presents to [facility] for   Thyroid Problem  Presents for follow-up visit. Symptoms include anxiety, cold intolerance, fatigue, heat intolerance, palpitations and weight gain. Patient reports no constipation, depressed mood, diaphoresis, diarrhea, dry skin, hair loss, hoarse voice, leg swelling, menstrual problem, nail problem, tremors, visual change or weight loss. (Patient reports her pervious doctor only checked her thyroid levels once and she states she feeling lethargic and wants to recheck her thyroid levels ) The symptoms have been worsening.        Objective   Vital Signs:   Visit Vitals  /68   Pulse 75   Temp 98.4 °F (36.9 °C)   Resp 18   Ht 154.9 cm (61\")   Wt 65.2 kg (143 lb 12.8 oz)   LMP 04/12/2021 (Exact Date)   SpO2 (!) 89%   BMI 27.17 kg/m²       Physical Exam  Vitals reviewed.   Constitutional:       Appearance: She is well-developed.   HENT:      Head: Normocephalic.      Right Ear: External ear normal.      Left Ear: External ear normal.      Nose: Nose normal.   Eyes:      Conjunctiva/sclera: Conjunctivae normal.   Cardiovascular:      Rate and Rhythm: Normal rate and regular rhythm.   Pulmonary:      Effort: Pulmonary effort is normal.      Breath sounds: Normal breath sounds.   Musculoskeletal:         General: Normal range of motion.      Cervical back: Normal range of motion and neck supple.   Skin:     General: Skin is warm and dry.      Capillary Refill: Capillary refill takes less than 2 seconds.   Neurological:      Mental Status: She is alert and oriented to person, place, and time.            Result Review :                    Assessment and Plan      Diagnoses and all orders for this visit:    1. Postablative hypothyroidism (Primary)  Assessment & Plan:  Blood work ordered.  Consider making medication " changes based on blood work.      2. Hypothyroidism due to Hashimoto's thyroiditis  Assessment & Plan:  Blood work ordered.  Consider making medication changes based on blood work.    Orders:  -     CBC & Differential  -     Comprehensive Metabolic Panel  -     T4, Free  -     T4  -     TSH  -     Triiodothyronine (T3) Total & Free    3. Chronic fatigue  Assessment & Plan:  Blood work ordered    Orders:  -     Vitamin D 25 Hydroxy  -     Vitamin B12  -     Folate    4. Irritable bowel syndrome with diarrhea  Assessment & Plan:  This is a 70 dicyclomine help treat her symptoms.  Patient was encouraged to return to clinic if her symptoms do not improve.    Orders:  -     dicyclomine (Bentyl) 10 MG capsule; Take 1 capsule by mouth 3 (Three) Times a Day As Needed (Diarrhea).  Dispense: 90 capsule; Refill: 2    5. Arthritis  Assessment & Plan:  Patient has been started on diclofenac    Orders:  -     diclofenac (VOLTAREN) 50 MG EC tablet; Take 1 tablet by mouth 2 (Two) Times a Day.  Dispense: 60 tablet; Refill: 3    Other orders  -     Cancel: Lipid Panel With / Chol / HDL Ratio           Follow Up   No follow-ups on file.  Patient was given instructions and counseling regarding her condition or for health maintenance advice. Please see specific information pulled into the AVS if appropriate.

## 2021-04-13 PROBLEM — K58.0 IRRITABLE BOWEL SYNDROME WITH DIARRHEA: Status: ACTIVE | Noted: 2021-04-13

## 2021-04-13 LAB
25(OH)D3+25(OH)D2 SERPL-MCNC: 31.4 NG/ML (ref 30–100)
ALBUMIN SERPL-MCNC: 4.4 G/DL (ref 3.8–4.8)
ALBUMIN/GLOB SERPL: 1.6 {RATIO} (ref 1.2–2.2)
ALP SERPL-CCNC: 66 IU/L (ref 39–117)
ALT SERPL-CCNC: 9 IU/L (ref 0–32)
AST SERPL-CCNC: 12 IU/L (ref 0–40)
BASOPHILS # BLD AUTO: 0.1 X10E3/UL (ref 0–0.2)
BASOPHILS NFR BLD AUTO: 1 %
BILIRUB SERPL-MCNC: 0.2 MG/DL (ref 0–1.2)
BUN SERPL-MCNC: 10 MG/DL (ref 6–20)
BUN/CREAT SERPL: 16 (ref 9–23)
CALCIUM SERPL-MCNC: 9.5 MG/DL (ref 8.7–10.2)
CHLORIDE SERPL-SCNC: 106 MMOL/L (ref 96–106)
CO2 SERPL-SCNC: 24 MMOL/L (ref 20–29)
CREAT SERPL-MCNC: 0.64 MG/DL (ref 0.57–1)
EOSINOPHIL # BLD AUTO: 0.1 X10E3/UL (ref 0–0.4)
EOSINOPHIL NFR BLD AUTO: 2 %
ERYTHROCYTE [DISTWIDTH] IN BLOOD BY AUTOMATED COUNT: 11.9 % (ref 11.7–15.4)
FOLATE SERPL-MCNC: 8.5 NG/ML
GLOBULIN SER CALC-MCNC: 2.8 G/DL (ref 1.5–4.5)
GLUCOSE SERPL-MCNC: 76 MG/DL (ref 65–99)
HCT VFR BLD AUTO: 40.1 % (ref 34–46.6)
HGB BLD-MCNC: 13.4 G/DL (ref 11.1–15.9)
IMM GRANULOCYTES # BLD AUTO: 0 X10E3/UL (ref 0–0.1)
IMM GRANULOCYTES NFR BLD AUTO: 0 %
LYMPHOCYTES # BLD AUTO: 1.6 X10E3/UL (ref 0.7–3.1)
LYMPHOCYTES NFR BLD AUTO: 27 %
MCH RBC QN AUTO: 29.5 PG (ref 26.6–33)
MCHC RBC AUTO-ENTMCNC: 33.4 G/DL (ref 31.5–35.7)
MCV RBC AUTO: 88 FL (ref 79–97)
MONOCYTES # BLD AUTO: 0.5 X10E3/UL (ref 0.1–0.9)
MONOCYTES NFR BLD AUTO: 8 %
NEUTROPHILS # BLD AUTO: 3.7 X10E3/UL (ref 1.4–7)
NEUTROPHILS NFR BLD AUTO: 62 %
PLATELET # BLD AUTO: 243 X10E3/UL (ref 150–450)
POTASSIUM SERPL-SCNC: 4.6 MMOL/L (ref 3.5–5.2)
PROT SERPL-MCNC: 7.2 G/DL (ref 6–8.5)
RBC # BLD AUTO: 4.54 X10E6/UL (ref 3.77–5.28)
SODIUM SERPL-SCNC: 142 MMOL/L (ref 134–144)
T3 SERPL-MCNC: 147 NG/DL (ref 71–180)
T3FREE SERPL-MCNC: 3.4 PG/ML (ref 2–4.4)
T4 FREE SERPL-MCNC: 1.89 NG/DL (ref 0.82–1.77)
T4 SERPL-MCNC: 11.6 UG/DL (ref 4.5–12)
TSH SERPL DL<=0.005 MIU/L-ACNC: 0.16 UIU/ML (ref 0.45–4.5)
VIT B12 SERPL-MCNC: 288 PG/ML (ref 232–1245)
WBC # BLD AUTO: 5.9 X10E3/UL (ref 3.4–10.8)

## 2021-04-14 NOTE — ASSESSMENT & PLAN NOTE
This is a 70 dicyclomine help treat her symptoms.  Patient was encouraged to return to clinic if her symptoms do not improve.

## 2021-04-21 ENCOUNTER — TELEPHONE (OUTPATIENT)
Dept: FAMILY MEDICINE CLINIC | Facility: CLINIC | Age: 31
End: 2021-04-21

## 2021-04-21 ENCOUNTER — OFFICE VISIT (OUTPATIENT)
Dept: FAMILY MEDICINE CLINIC | Facility: CLINIC | Age: 31
End: 2021-04-21

## 2021-04-21 VITALS
DIASTOLIC BLOOD PRESSURE: 74 MMHG | HEIGHT: 61 IN | HEART RATE: 75 BPM | RESPIRATION RATE: 18 BRPM | TEMPERATURE: 97.8 F | SYSTOLIC BLOOD PRESSURE: 110 MMHG | BODY MASS INDEX: 27.98 KG/M2 | WEIGHT: 148.2 LBS | OXYGEN SATURATION: 99 %

## 2021-04-21 DIAGNOSIS — E89.0 POSTABLATIVE HYPOTHYROIDISM: Primary | ICD-10-CM

## 2021-04-21 DIAGNOSIS — E05.00 GRAVES' DISEASE: Primary | ICD-10-CM

## 2021-04-21 PROBLEM — Z87.891 HISTORY OF TOBACCO USE: Status: ACTIVE | Noted: 2021-04-21

## 2021-04-21 PROCEDURE — 99213 OFFICE O/P EST LOW 20 MIN: CPT | Performed by: FAMILY MEDICINE

## 2021-04-21 RX ORDER — LEVOTHYROXINE SODIUM 125 MCG
125 TABLET ORAL DAILY
Qty: 90 TABLET | Refills: 1 | Status: SHIPPED | OUTPATIENT
Start: 2021-04-21 | End: 2021-04-23 | Stop reason: CLARIF

## 2021-04-21 RX ORDER — LEVOTHYROXINE SODIUM 125 UG/1
125 CAPSULE ORAL DAILY
Qty: 90 CAPSULE | Refills: 1 | Status: SHIPPED | OUTPATIENT
Start: 2021-04-21 | End: 2021-04-21

## 2021-04-21 NOTE — PROGRESS NOTES
"Chief Complaint  Hypothyroidism    Subjective    History of Present Illness        Brigida Campos presents to [facility] for   Thyroid Problem  Presents for follow-up visit. Symptoms include anxiety, cold intolerance, fatigue, heat intolerance, palpitations and weight gain. Patient reports no constipation, depressed mood, diaphoresis, diarrhea, dry skin, hair loss, hoarse voice, leg swelling, menstrual problem, nail problem, tremors, visual change or weight loss. (Patient reports her pervious doctor only checked her thyroid levels once and she states she feeling lethargic and wants to recheck her thyroid levels   4/21/2021- The patient is here today and following up on the labs that was recently drawn for her thyroid panel. ) The symptoms have been worsening.      Lab review:   4/21/2021- The patient is here today and following up on her labs that was done on 4/12/2021. Her Folate and CMP. Her thyroid panel was off. Her T4 free was 1.89 her t4 total 11.6. Her TSH was elevated at  0.163. her vitamin d was on the lower side of normal at 31.4 and her Vitamin B-12 was 288.    Objective   Vital Signs:   Visit Vitals  /74   Pulse 75   Temp 97.8 °F (36.6 °C)   Resp 18   Ht 154.9 cm (61\")   Wt 67.2 kg (148 lb 3.2 oz)   LMP 04/12/2021 (Exact Date)   SpO2 99%   BMI 28.00 kg/m²       Physical Exam  Vitals reviewed.   Constitutional:       Appearance: She is well-developed.   HENT:      Head: Normocephalic.      Right Ear: External ear normal.      Left Ear: External ear normal.      Nose: Nose normal.   Eyes:      Conjunctiva/sclera: Conjunctivae normal.   Cardiovascular:      Rate and Rhythm: Normal rate and regular rhythm.   Pulmonary:      Effort: Pulmonary effort is normal.      Breath sounds: Normal breath sounds.   Musculoskeletal:         General: Normal range of motion.      Cervical back: Normal range of motion and neck supple.   Skin:     General: Skin is warm and dry.      " Capillary Refill: Capillary refill takes less than 2 seconds.   Neurological:      Mental Status: She is alert and oriented to person, place, and time.            Result Review :      Common labs    Common Labsle 4/12/21 4/12/21    1145 1145   Glucose  76   BUN  10   Creatinine  0.64   eGFR Non  Am  118   eGFR African Am  137   Sodium  142   Potassium  4.6   Chloride  106   Calcium  9.5   Total Protein  7.2   Albumin  4.4   Total Bilirubin  0.2   Alkaline Phosphatase  66   AST (SGOT)  12   ALT (SGPT)  9   WBC 5.9    Hemoglobin 13.4    Hematocrit 40.1    Platelets 243            CMP    CMP 4/12/21   Glucose 76   BUN 10   Creatinine 0.64   eGFR Non  Am 118   eGFR African Am 137   Sodium 142   Potassium 4.6   Chloride 106   Calcium 9.5   Total Protein 7.2   Albumin 4.4   Globulin 2.8   Total Bilirubin 0.2   Alkaline Phosphatase 66   AST (SGOT) 12   ALT (SGPT) 9           CBC    CBC 4/12/21   WBC 5.9   RBC 4.54   Hemoglobin 13.4   Hematocrit 40.1   MCV 88   MCH 29.5   MCHC 33.4   RDW 11.9   Platelets 243           CBC w/diff    CBC w/Diff 4/12/21   WBC 5.9   RBC 4.54   Hemoglobin 13.4   Hematocrit 40.1   MCV 88   MCH 29.5   MCHC 33.4   RDW 11.9   Platelets 243   Neutrophil Rel % 62   Lymphocyte Rel % 27   Monocyte Rel % 8   Eosinophil Rel % 2   Basophil Rel % 1               TSH    TSH 4/12/21   TSH 0.163 (A)   (A) Abnormal value                        Assessment and Plan      Diagnoses and all orders for this visit:    1. Postablative hypothyroidism (Primary)  Assessment & Plan:  Patient's thyroid labs are elevated.  Patient's thyroid medication was decreased to 125 mcg.  Patient was prescribed Synthroid instead of levothyroxine.      Other orders  -     Discontinue: levothyroxine sodium (TIROSINT) 125 MCG capsule capsule; Take 1 capsule by mouth Daily.  Dispense: 90 capsule; Refill: 1           Follow Up   No follow-ups on file.  Patient was given instructions and counseling regarding her condition or  for health maintenance advice. Please see specific information pulled into the AVS if appropriate.

## 2021-04-21 NOTE — TELEPHONE ENCOUNTER
Caller: Brigida Campos    Relationship: Self    Best call back number: [phone]    What medications are you currently taking:   Current Outpatient Medications on File Prior to Visit   Medication Sig Dispense Refill   • buPROPion XL (WELLBUTRIN XL) 150 MG 24 hr tablet Take 1 tablet by mouth Every Morning. 90 tablet 1   • diclofenac (VOLTAREN) 50 MG EC tablet Take 1 tablet by mouth 2 (Two) Times a Day. 60 tablet 3   • dicyclomine (Bentyl) 10 MG capsule Take 1 capsule by mouth 3 (Three) Times a Day As Needed (Diarrhea). 90 capsule 2   • levothyroxine sodium (TIROSINT) 125 MCG capsule capsule Take 1 capsule by mouth Daily. 90 capsule 1   • [DISCONTINUED] levothyroxine (SYNTHROID, LEVOTHROID) 137 MCG tablet Take 1 tablet by mouth Daily. 90 tablet 1     No current facility-administered medications on file prior to visit.        When did you start taking these medications: TODAY    Which medication are you concerned about: levothyroxine sodium (TIROSINT) 125 MCG capsule capsule    Who prescribed you this medication: DR CHO    What are your concerns: INSURANCE WILL NOT COVER THE TIROSINT. PATIENT STATED THE PHARMACY SAID TO WRITE A PRESCRIPTION FOR LEVOTHHYROXINE 125MCG TO BE FILLED.

## 2021-04-22 NOTE — ASSESSMENT & PLAN NOTE
Patient's thyroid labs are elevated.  Patient's thyroid medication was decreased to 125 mcg.  Patient was prescribed Synthroid instead of levothyroxine.

## 2021-04-23 ENCOUNTER — PATIENT MESSAGE (OUTPATIENT)
Dept: FAMILY MEDICINE CLINIC | Facility: CLINIC | Age: 31
End: 2021-04-23

## 2021-04-23 DIAGNOSIS — E05.00 GRAVES' DISEASE: Primary | ICD-10-CM

## 2021-04-23 RX ORDER — LEVOTHYROXINE SODIUM 0.12 MG/1
125 TABLET ORAL DAILY
Qty: 90 TABLET | Refills: 1 | Status: SHIPPED | OUTPATIENT
Start: 2021-04-23 | End: 2021-11-22 | Stop reason: SDUPTHER

## 2021-04-23 NOTE — TELEPHONE ENCOUNTER
From: Brigida Campos  To: Brennon Whyte Sr, MD  Sent: 4/23/2021 8:51 AM EDT  Subject: Prescription Question    Is there anyway you can send levithyroxine script instead of synthroid. My insurance won't cover it. I tried different pharmacy and it's still $112. I can't afford that. Maybe this vitamin b12 and D will help. I do feel better past two days.

## 2021-06-21 RX ORDER — BUPROPION HYDROCHLORIDE 150 MG/1
150 TABLET ORAL EVERY MORNING
Qty: 90 TABLET | Refills: 1 | OUTPATIENT
Start: 2021-06-21

## 2021-06-23 RX ORDER — BUPROPION HYDROCHLORIDE 150 MG/1
150 TABLET ORAL EVERY MORNING
Qty: 90 TABLET | Refills: 1 | OUTPATIENT
Start: 2021-06-23

## 2021-06-24 RX ORDER — BUPROPION HYDROCHLORIDE 150 MG/1
150 TABLET ORAL EVERY MORNING
Qty: 90 TABLET | Refills: 1 | Status: SHIPPED | OUTPATIENT
Start: 2021-06-24 | End: 2021-09-22 | Stop reason: SDUPTHER

## 2021-06-30 ENCOUNTER — OFFICE VISIT (OUTPATIENT)
Dept: FAMILY MEDICINE CLINIC | Facility: CLINIC | Age: 31
End: 2021-06-30

## 2021-06-30 VITALS
OXYGEN SATURATION: 98 % | WEIGHT: 139.4 LBS | RESPIRATION RATE: 18 BRPM | SYSTOLIC BLOOD PRESSURE: 111 MMHG | TEMPERATURE: 98.2 F | DIASTOLIC BLOOD PRESSURE: 75 MMHG | HEIGHT: 61 IN | HEART RATE: 75 BPM | BODY MASS INDEX: 26.32 KG/M2

## 2021-06-30 DIAGNOSIS — Z00.00 PHYSICAL EXAM: Primary | ICD-10-CM

## 2021-06-30 PROCEDURE — 99395 PREV VISIT EST AGE 18-39: CPT | Performed by: FAMILY MEDICINE

## 2021-06-30 NOTE — PROGRESS NOTES
"Chief Complaint  Annual Exam    Subjective    History of Present Illness        Brigida Campos presents to [facility] for   History of Present Illness   Physical exam :   Brigida Campos is a 30 y.o. female here for her annual physical with me. Brigida is here for coordination of medical care, to discuss health maintenance, disease prevention as well as to followup on medical problems. Patient has been followed by me for a while now . Patient's last CPE was unknown. Activity level is moderate. Exercises 4 per week. Appetite is fair. Feels fairly well with few complaints. Energy level is fair. Sleeps fairly well. Patient is doing routine self skin exam monthly. Patient is doing routine self-breast exams she states that she does not perform these.    Objective   Vital Signs:   Visit Vitals  /75   Pulse 75   Temp 98.2 °F (36.8 °C)   Resp 18   Ht 154.9 cm (61\")   Wt 63.2 kg (139 lb 6.4 oz)   SpO2 98%   BMI 26.34 kg/m²       Physical Exam  Vitals reviewed.   Constitutional:       Appearance: She is well-developed.   HENT:      Head: Normocephalic and atraumatic.      Nose: Nose normal.   Eyes:      General: Lids are normal.      Conjunctiva/sclera: Conjunctivae normal.      Pupils: Pupils are equal, round, and reactive to light.   Cardiovascular:      Rate and Rhythm: Normal rate and regular rhythm.      Pulses: Normal pulses.      Heart sounds: Normal heart sounds.   Pulmonary:      Effort: Pulmonary effort is normal. No respiratory distress.      Breath sounds: Normal breath sounds.   Abdominal:      General: Bowel sounds are normal.      Palpations: Abdomen is soft.   Musculoskeletal:         General: Normal range of motion.      Cervical back: Normal range of motion and neck supple.   Skin:     General: Skin is warm and dry.      Capillary Refill: Capillary refill takes less than 2 seconds.   Neurological:      Mental Status: She is alert and oriented to person, place, and time. "   Psychiatric:         Behavior: Behavior normal.         Thought Content: Thought content normal.         Judgment: Judgment normal.            Result Review :                    Assessment and Plan      Diagnoses and all orders for this visit:    1. Physical exam (Primary)  Assessment & Plan:  Discussed injury prevention, diet and exercise, safe sexual practices, and screening for common diseases. Encouraged use of sunscreen and seatbelts. Discussed timing of  cervical cancer screening. Encouraged monthly self-breast exams, yearly clinical breast exams, and discussed timing of mammograms. Avoidance of tobacco encouraged. Limitation or avoidance of alcohol encouraged. Recommend yearly dental and eye exams. Also discussed monitoring of blood pressure, lipids.             Follow Up   No follow-ups on file.  Patient was given instructions and counseling regarding her condition or for health maintenance advice. Please see specific information pulled into the AVS if appropriate.

## 2021-09-22 ENCOUNTER — OFFICE VISIT (OUTPATIENT)
Dept: FAMILY MEDICINE CLINIC | Facility: CLINIC | Age: 31
End: 2021-09-22

## 2021-09-22 VITALS
SYSTOLIC BLOOD PRESSURE: 110 MMHG | DIASTOLIC BLOOD PRESSURE: 66 MMHG | HEART RATE: 69 BPM | OXYGEN SATURATION: 96 % | BODY MASS INDEX: 25.94 KG/M2 | TEMPERATURE: 97.8 F | WEIGHT: 137.4 LBS | RESPIRATION RATE: 16 BRPM | HEIGHT: 61 IN

## 2021-09-22 DIAGNOSIS — F41.9 ANXIETY: Primary | ICD-10-CM

## 2021-09-22 DIAGNOSIS — J06.9 UPPER RESPIRATORY TRACT INFECTION, UNSPECIFIED TYPE: ICD-10-CM

## 2021-09-22 PROCEDURE — 99214 OFFICE O/P EST MOD 30 MIN: CPT | Performed by: FAMILY MEDICINE

## 2021-09-22 RX ORDER — BUPROPION HYDROCHLORIDE 300 MG/1
300 TABLET ORAL EVERY MORNING
Qty: 90 TABLET | Refills: 2 | Status: SHIPPED | OUTPATIENT
Start: 2021-09-22 | End: 2022-01-13 | Stop reason: SDUPTHER

## 2021-09-22 RX ORDER — CIPROFLOXACIN 500 MG/1
500 TABLET, FILM COATED ORAL 2 TIMES DAILY
Qty: 20 TABLET | Refills: 0 | Status: SHIPPED | OUTPATIENT
Start: 2021-09-22 | End: 2021-12-30

## 2021-09-22 RX ORDER — CHLORCYCLIZINE HYDROCHLORIDE AND PSEUDOEPHEDRINE HYDROCHLORIDE 25; 60 MG/1; MG/1
1 TABLET ORAL 2 TIMES DAILY
Qty: 42 TABLET | Refills: 0 | Status: SHIPPED | OUTPATIENT
Start: 2021-09-22 | End: 2021-12-30

## 2021-09-23 DIAGNOSIS — J06.9 UPPER RESPIRATORY TRACT INFECTION, UNSPECIFIED TYPE: Primary | ICD-10-CM

## 2021-09-23 RX ORDER — SULFAMETHOXAZOLE AND TRIMETHOPRIM 800; 160 MG/1; MG/1
1 TABLET ORAL 2 TIMES DAILY
Qty: 14 TABLET | Refills: 0 | Status: SHIPPED | OUTPATIENT
Start: 2021-09-23 | End: 2021-09-30

## 2021-09-24 LAB
LABCORP SARS-COV-2, NAA 2 DAY TAT: NORMAL
SARS-COV-2 RNA RESP QL NAA+PROBE: NOT DETECTED

## 2021-10-04 NOTE — ASSESSMENT & PLAN NOTE
Patient was tested for Covid and results were negative.  she was prescribed Cipro and Stahist to treat her symptoms.    Increase fluids. Tylenol/motrin for pain or fever.   Medication and medication adverse effects discussed.    Follow-up 5-7 days for reevaluation if not improved or sooner if needed.

## 2021-10-04 NOTE — ASSESSMENT & PLAN NOTE
Patient was started on Wellbutrin to help treat her symptoms.  Patient was encouraged to return to clinic in 1 month for follow-up.

## 2021-11-22 DIAGNOSIS — E05.00 GRAVES' DISEASE: ICD-10-CM

## 2021-11-22 RX ORDER — LEVOTHYROXINE SODIUM 0.12 MG/1
125 TABLET ORAL DAILY
Qty: 90 TABLET | Refills: 1 | Status: SHIPPED | OUTPATIENT
Start: 2021-11-22 | End: 2021-12-30

## 2021-12-28 NOTE — PROGRESS NOTES
Chief Complaint   Patient presents with   • Anxiety     follow up   • Hypothyroidism     follow up       History of Present Illness:  Subjective   Brigida Campos is a 31 y.o. female.   Thyroid Problem  Presents for follow-up visit. Symptoms include anxiety, cold intolerance, fatigue, heat intolerance, palpitations and weight gain. Patient reports no constipation, depressed mood, diaphoresis, dry skin, hair loss, hoarse voice, leg swelling, menstrual problem, nail problem, tremors, visual change or weight loss. (Patient reports her pervious doctor only checked her thyroid levels once and she states she feeling lethargic and wants to recheck her thyroid levels   4/21/2021- The patient is here today and following up on the labs that was recently drawn for her thyroid panel. ) The symptoms have been stable.   Anxiety  Presents for follow-up visit. Symptoms include excessive worry, irritability, nervous/anxious behavior, palpitations, panic and restlessness. Patient reports no depressed mood. Symptoms occur most days. Duration: varies. The severity of symptoms is mild and causing significant distress (to severe). The patient sleeps 6 hours per night. The quality of sleep is non-restorative. Nighttime awakenings: several.     Her past medical history is significant for depression. Compliance with medications is %.   Depression  Visit Type: follow-up  Patient presents with the following symptoms: excessive worry, feelings of hopelessness, irritability, nervousness/anxiety, palpitations, panic, restlessness and weight gain.  Patient is not experiencing: depressed mood and weight loss.  Frequency of symptoms: most days   Duration: varies.  Severity: mild (to severe)   Sleep per night: 6 hours  Sleep quality: non-restorative  Nighttime awakenings: several  Compliance with medications:  %             Allergies:  No Known Allergies    Social History:  Social History     Socioeconomic History   • Marital status:     Tobacco Use   • Smoking status: Former Smoker     Packs/day: 1.00     Years: 10.00     Pack years: 10.00     Types: Cigarettes   • Smokeless tobacco: Never Used   Vaping Use   • Vaping Use: Never used   Substance and Sexual Activity   • Alcohol use: No   • Drug use: No   • Sexual activity: Defer       Family History:  Family History   Problem Relation Age of Onset   • Diabetes Maternal Grandmother    • Heart disease Maternal Grandmother    • Heart disease Paternal Grandfather    • Diabetes Paternal Grandfather        Past Medical History :  Active Ambulatory Problems     Diagnosis Date Noted   • Depression 04/12/2016   • Encounter for general adult medical examination without abnormal findings 02/25/2016   • Fatigue 08/28/2018   • History of thyroiditis 02/25/2016   • Hyperglycemia 03/27/2019   • Hypothyroidism 06/25/2019   • Postpartum state 08/28/2018   • Thyroid nodule 02/25/2016   • Sinusitis 07/23/2012   • Asthmatic bronchitis 02/05/2020   • Tobacco use 02/05/2020   • Anxiety 02/05/2020   • Abnormal glucose 02/05/2020   • Arthritis 04/12/2021   • Vaginitis 04/12/2021   • CRP elevated 04/12/2021   • Edema 04/12/2021   • Graves' disease 04/12/2021   • Myalgia 04/12/2021   • Overweight 04/12/2021   • Upper respiratory infection 04/12/2021   • Sore throat 04/12/2021   • Yeast infection 04/12/2021   • Anxiety 04/12/2021   • Hypothyroidism 04/12/2021   • Acute sinusitis 04/12/2021   • Irritable bowel syndrome with diarrhea 04/13/2021   • History of tobacco use 04/21/2021   • Physical exam 06/30/2021     Resolved Ambulatory Problems     Diagnosis Date Noted   • No Resolved Ambulatory Problems     No Additional Past Medical History       Medication List:  Outpatient Encounter Medications as of 12/30/2021   Medication Sig Dispense Refill   • buPROPion XL (WELLBUTRIN XL) 300 MG 24 hr tablet Take 1 tablet by mouth Every Morning. 90 tablet 2   • diclofenac (VOLTAREN) 50 MG EC tablet Take 1 tablet by mouth 2  "(Two) Times a Day. 60 tablet 3   • dicyclomine (Bentyl) 10 MG capsule Take 1 capsule by mouth 3 (Three) Times a Day As Needed (Diarrhea). 90 capsule 2   • [DISCONTINUED] levothyroxine (SYNTHROID, LEVOTHROID) 125 MCG tablet Take 1 tablet by mouth Daily. 90 tablet 1   • Synthroid 125 MCG tablet Take 1 tablet by mouth Daily. 90 tablet 2   • [DISCONTINUED] Chlorcyclizine-Pseudoephed (Stahist AD) 25-60 MG tablet Take 1 tablet by mouth 2 (two) times a day. 42 tablet 0   • [DISCONTINUED] ciprofloxacin (CIPRO) 500 MG tablet Take 1 tablet by mouth 2 (Two) Times a Day. 20 tablet 0     No facility-administered encounter medications on file as of 12/30/2021.       Past Surgical History:  Past Surgical History:   Procedure Laterality Date   • OTHER SURGICAL HISTORY  01/2019    Tubal    • TUBAL ABDOMINAL LIGATION          The following portions of the patient's history were reviewed and updated as appropriate: allergies, current medications, past family history, past medical history, past social history, past surgical history and problem list.    Review Of Systems:  Review of Systems   Constitutional: Positive for fatigue, irritability and unexpected weight gain. Negative for diaphoresis and unexpected weight loss.   HENT: Negative for hoarse voice.    Cardiovascular: Positive for palpitations.   Gastrointestinal: Negative for constipation.   Endocrine: Positive for cold intolerance and heat intolerance.   Genitourinary: Negative for menstrual problem.   Skin: Negative for dry skin.   Neurological: Negative for tremors.   Psychiatric/Behavioral: Negative for depressed mood. The patient is nervous/anxious.        Objective     Physical Exam:  Vital Signs:  Visit Vitals  /60 (BP Location: Right arm, Patient Position: Sitting, Cuff Size: Adult)   Pulse 70   Temp 97.5 °F (36.4 °C) (Skin)   Resp 16   Ht 154.9 cm (61\")   Wt 67.4 kg (148 lb 9.6 oz)   SpO2 97%   BMI 28.08 kg/m²       Physical Exam  Vitals reviewed. "   Constitutional:       Appearance: She is well-developed.   HENT:      Head: Normocephalic.      Right Ear: External ear normal.      Left Ear: External ear normal.      Nose: Nose normal.   Eyes:      Conjunctiva/sclera: Conjunctivae normal.   Cardiovascular:      Rate and Rhythm: Normal rate and regular rhythm.   Pulmonary:      Effort: Pulmonary effort is normal.      Breath sounds: Normal breath sounds.   Musculoskeletal:         General: Normal range of motion.      Cervical back: Normal range of motion and neck supple.   Skin:     General: Skin is warm and dry.      Capillary Refill: Capillary refill takes less than 2 seconds.   Neurological:      Mental Status: She is alert and oriented to person, place, and time.           Assessment/Plan   Assessment and Plan:  Diagnoses and all orders for this visit:    1. Moderate episode of recurrent major depressive disorder (HCC) (Primary)  Assessment & Plan:  Patient's depression is recurrent and is moderate without psychosis. Their depression is currently active and the condition is unchanged. This will be reassessed at the next regular appointment. F/U as described:patient was prescribed an antidepressant medicine and patient depression is being managed by their pcp.      2. Anxiety  Assessment & Plan:  She was advised to meditate, exercise and continue the medications.      3. Postablative hypothyroidism  Assessment & Plan:  She was given a prescription of Synthroid (trade name) to help with her symptoms.   She was encouraged to RTC in 3 months.      Orders:  -     TSH  -     T4  -     T4, free  -     T3, Free  -     Synthroid 125 MCG tablet; Take 1 tablet by mouth Daily.  Dispense: 90 tablet; Refill: 2    4. Chronic fatigue  -     CBC w AUTO Differential    5. Screening for hyperlipidemia  -     Comprehensive metabolic panel  -     Lipid Panel With / Chol / HDL Ratio

## 2021-12-30 ENCOUNTER — OFFICE VISIT (OUTPATIENT)
Dept: FAMILY MEDICINE CLINIC | Facility: CLINIC | Age: 31
End: 2021-12-30

## 2021-12-30 VITALS
HEIGHT: 61 IN | SYSTOLIC BLOOD PRESSURE: 114 MMHG | BODY MASS INDEX: 28.05 KG/M2 | HEART RATE: 70 BPM | OXYGEN SATURATION: 97 % | TEMPERATURE: 97.5 F | RESPIRATION RATE: 16 BRPM | DIASTOLIC BLOOD PRESSURE: 60 MMHG | WEIGHT: 148.6 LBS

## 2021-12-30 DIAGNOSIS — F41.9 ANXIETY: ICD-10-CM

## 2021-12-30 DIAGNOSIS — F33.1 MODERATE EPISODE OF RECURRENT MAJOR DEPRESSIVE DISORDER (HCC): Primary | ICD-10-CM

## 2021-12-30 DIAGNOSIS — Z13.220 SCREENING FOR HYPERLIPIDEMIA: ICD-10-CM

## 2021-12-30 DIAGNOSIS — R53.82 CHRONIC FATIGUE: ICD-10-CM

## 2021-12-30 DIAGNOSIS — E89.0 POSTABLATIVE HYPOTHYROIDISM: ICD-10-CM

## 2021-12-30 PROCEDURE — 99214 OFFICE O/P EST MOD 30 MIN: CPT | Performed by: FAMILY MEDICINE

## 2021-12-30 RX ORDER — LEVOTHYROXINE SODIUM 125 MCG
125 TABLET ORAL DAILY
Qty: 90 TABLET | Refills: 2 | Status: SHIPPED | OUTPATIENT
Start: 2021-12-30 | End: 2022-03-24

## 2021-12-30 NOTE — ASSESSMENT & PLAN NOTE
Patient's depression is recurrent and is moderate without psychosis. Their depression is currently active and the condition is unchanged. This will be reassessed at the next regular appointment. F/U as described:patient was prescribed an antidepressant medicine and patient depression is being managed by their pcp.

## 2021-12-30 NOTE — ASSESSMENT & PLAN NOTE
She was given a prescription of Synthroid (trade name) to help with her symptoms.   She was encouraged to RTC in 3 months.

## 2022-01-06 ENCOUNTER — OFFICE VISIT (OUTPATIENT)
Dept: FAMILY MEDICINE CLINIC | Facility: CLINIC | Age: 32
End: 2022-01-06

## 2022-01-06 VITALS
HEIGHT: 61 IN | RESPIRATION RATE: 17 BRPM | SYSTOLIC BLOOD PRESSURE: 132 MMHG | HEART RATE: 94 BPM | TEMPERATURE: 98.1 F | BODY MASS INDEX: 28.08 KG/M2 | OXYGEN SATURATION: 96 % | DIASTOLIC BLOOD PRESSURE: 92 MMHG

## 2022-01-06 DIAGNOSIS — J06.9 VIRAL UPPER RESPIRATORY TRACT INFECTION: Primary | ICD-10-CM

## 2022-01-06 PROCEDURE — 99213 OFFICE O/P EST LOW 20 MIN: CPT | Performed by: FAMILY MEDICINE

## 2022-01-06 RX ORDER — ALBUTEROL SULFATE 90 UG/1
2 AEROSOL, METERED RESPIRATORY (INHALATION) EVERY 4 HOURS PRN
Qty: 18 G | Refills: 2 | Status: SHIPPED | OUTPATIENT
Start: 2022-01-06 | End: 2022-01-07 | Stop reason: SDUPTHER

## 2022-01-06 RX ORDER — AMOXICILLIN AND CLAVULANATE POTASSIUM 500; 125 MG/1; MG/1
1 TABLET, FILM COATED ORAL 2 TIMES DAILY
Qty: 20 TABLET | Refills: 0 | Status: SHIPPED | OUTPATIENT
Start: 2022-01-06 | End: 2022-01-07 | Stop reason: SDUPTHER

## 2022-01-06 RX ORDER — PREDNISONE 20 MG/1
40 TABLET ORAL DAILY
Qty: 10 TABLET | Refills: 0 | Status: SHIPPED | OUTPATIENT
Start: 2022-01-06 | End: 2022-01-07 | Stop reason: SDUPTHER

## 2022-01-06 NOTE — PROGRESS NOTES
"Chief Complaint  URI    Subjective    History of Present Illness        Brigida Campos presents to [facility] for   URI   This is a new problem. The current episode started in the past 7 days. The problem has been gradually worsening. There has been no fever. The fever has been present for less than 1 day. Associated symptoms include congestion, coughing, headaches, rhinorrhea, sinus pain and sneezing. Pertinent negatives include no abdominal pain, chest pain, diarrhea, dysuria, ear pain, joint pain, joint swelling, nausea, neck pain, plugged ear sensation, rash, sore throat, swollen glands, vomiting or wheezing. Associated symptoms comments: Patient is also fatigue   Patient states that she is not sure as to if she has sinus issues and or if she has been exposed to COVID. Patient states that she feels she has had a harder time breathing today and she has had some shortness of breath. She states that she is very very fatigue and has no energy. . She has tried nothing for the symptoms. The treatment provided no relief.        Objective   Vital Signs:   Visit Vitals  /92   Pulse 94   Temp 98.1 °F (36.7 °C)   Resp 17   Ht 154.9 cm (61\")   SpO2 96%   BMI 28.08 kg/m²       Physical Exam  Vitals reviewed.   Constitutional:       Appearance: She is well-developed.   HENT:      Head: Normocephalic.      Right Ear: External ear normal.      Left Ear: External ear normal.      Nose: Nose normal.   Eyes:      Conjunctiva/sclera: Conjunctivae normal.   Cardiovascular:      Rate and Rhythm: Normal rate and regular rhythm.   Pulmonary:      Effort: Pulmonary effort is normal.      Breath sounds: Normal breath sounds.   Musculoskeletal:         General: Normal range of motion.      Cervical back: Normal range of motion and neck supple.   Skin:     General: Skin is warm and dry.      Capillary Refill: Capillary refill takes less than 2 seconds.   Neurological:      Mental Status: She is alert " and oriented to person, place, and time.            Result Review :                    Assessment and Plan      Diagnoses and all orders for this visit:    1. Viral upper respiratory tract infection (Primary)  Assessment & Plan:  Discussed viral URI's, cause, typical course and treatment options. Discussed that antibiotics do not shorten the duration of viral illnesses. Nasal saline/suction bulb, cool mist humidifier, postural drainage discussed in office today.  Reviewed s/s needing further investigation and those for which to present to ER.    Orders:  -     COVID-19,[facility],NP/OP Swab in Transport Media or ESwab 72 HR TAT - Swab, Anterior nasal  -     Discontinue: predniSONE (DELTASONE) 20 MG tablet; Take 2 tablets by mouth Daily for 5 days.  Dispense: 10 tablet; Refill: 0  -     Discontinue: albuterol sulfate  (90 Base) MCG/ACT inhaler; Inhale 2 puffs Every 4 (Four) Hours As Needed for Wheezing.  Dispense: 18 g; Refill: 2  -     Discontinue: amoxicillin-clavulanate (Augmentin) 500-125 MG per tablet; Take 1 tablet by mouth 2 (Two) Times a Day for 10 days.  Dispense: 20 tablet; Refill: 0           Follow Up   No follow-ups on file.  Patient was given instructions and counseling regarding her condition or for health maintenance advice. Please see specific information pulled into the AVS if appropriate.

## 2022-01-07 DIAGNOSIS — J06.9 VIRAL UPPER RESPIRATORY TRACT INFECTION: ICD-10-CM

## 2022-01-07 RX ORDER — ALBUTEROL SULFATE 90 UG/1
2 AEROSOL, METERED RESPIRATORY (INHALATION) EVERY 4 HOURS PRN
Qty: 18 G | Refills: 2 | Status: SHIPPED | OUTPATIENT
Start: 2022-01-07

## 2022-01-07 RX ORDER — AMOXICILLIN AND CLAVULANATE POTASSIUM 500; 125 MG/1; MG/1
1 TABLET, FILM COATED ORAL 2 TIMES DAILY
Qty: 20 TABLET | Refills: 0 | Status: SHIPPED | OUTPATIENT
Start: 2022-01-07 | End: 2022-01-17

## 2022-01-07 RX ORDER — PREDNISONE 20 MG/1
40 TABLET ORAL DAILY
Qty: 10 TABLET | Refills: 0 | Status: SHIPPED | OUTPATIENT
Start: 2022-01-07 | End: 2022-01-12

## 2022-01-08 LAB
LABCORP SARS-COV-2, NAA 2 DAY TAT: NORMAL
SARS-COV-2 RNA RESP QL NAA+PROBE: NOT DETECTED

## 2022-01-12 LAB
ALBUMIN SERPL-MCNC: 4.5 G/DL (ref 3.8–4.8)
ALBUMIN/GLOB SERPL: 1.7 {RATIO} (ref 1.2–2.2)
ALP SERPL-CCNC: 68 IU/L (ref 44–121)
ALT SERPL-CCNC: 10 IU/L (ref 0–32)
AST SERPL-CCNC: 15 IU/L (ref 0–40)
BASOPHILS # BLD AUTO: 0.1 X10E3/UL (ref 0–0.2)
BASOPHILS NFR BLD AUTO: 1 %
BILIRUB SERPL-MCNC: <0.2 MG/DL (ref 0–1.2)
BUN SERPL-MCNC: 11 MG/DL (ref 6–20)
BUN/CREAT SERPL: 12 (ref 9–23)
CALCIUM SERPL-MCNC: 9.6 MG/DL (ref 8.7–10.2)
CHLORIDE SERPL-SCNC: 101 MMOL/L (ref 96–106)
CHOLEST SERPL-MCNC: 192 MG/DL (ref 100–199)
CHOLEST/HDLC SERPL: 3.1 RATIO (ref 0–4.4)
CO2 SERPL-SCNC: 28 MMOL/L (ref 20–29)
CREAT SERPL-MCNC: 0.9 MG/DL (ref 0.57–1)
EOSINOPHIL # BLD AUTO: 0 X10E3/UL (ref 0–0.4)
EOSINOPHIL NFR BLD AUTO: 0 %
ERYTHROCYTE [DISTWIDTH] IN BLOOD BY AUTOMATED COUNT: 11.8 % (ref 11.7–15.4)
GLOBULIN SER CALC-MCNC: 2.7 G/DL (ref 1.5–4.5)
GLUCOSE SERPL-MCNC: 83 MG/DL (ref 65–99)
HCT VFR BLD AUTO: 40.7 % (ref 34–46.6)
HDLC SERPL-MCNC: 61 MG/DL
HGB BLD-MCNC: 13.8 G/DL (ref 11.1–15.9)
IMM GRANULOCYTES # BLD AUTO: 0 X10E3/UL (ref 0–0.1)
IMM GRANULOCYTES NFR BLD AUTO: 0 %
LDLC SERPL CALC-MCNC: 109 MG/DL (ref 0–99)
LYMPHOCYTES # BLD AUTO: 1.9 X10E3/UL (ref 0.7–3.1)
LYMPHOCYTES NFR BLD AUTO: 24 %
MCH RBC QN AUTO: 30.3 PG (ref 26.6–33)
MCHC RBC AUTO-ENTMCNC: 33.9 G/DL (ref 31.5–35.7)
MCV RBC AUTO: 89 FL (ref 79–97)
MONOCYTES # BLD AUTO: 0.5 X10E3/UL (ref 0.1–0.9)
MONOCYTES NFR BLD AUTO: 6 %
NEUTROPHILS # BLD AUTO: 5.5 X10E3/UL (ref 1.4–7)
NEUTROPHILS NFR BLD AUTO: 69 %
PLATELET # BLD AUTO: 263 X10E3/UL (ref 150–450)
POTASSIUM SERPL-SCNC: 4.3 MMOL/L (ref 3.5–5.2)
PROT SERPL-MCNC: 7.2 G/DL (ref 6–8.5)
RBC # BLD AUTO: 4.56 X10E6/UL (ref 3.77–5.28)
SODIUM SERPL-SCNC: 142 MMOL/L (ref 134–144)
T3FREE SERPL-MCNC: 1.9 PG/ML (ref 2–4.4)
T4 FREE SERPL-MCNC: 1 NG/DL (ref 0.82–1.77)
T4 SERPL-MCNC: 7.9 UG/DL (ref 4.5–12)
TRIGL SERPL-MCNC: 124 MG/DL (ref 0–149)
TSH SERPL DL<=0.005 MIU/L-ACNC: 19.3 UIU/ML (ref 0.45–4.5)
VLDLC SERPL CALC-MCNC: 22 MG/DL (ref 5–40)
WBC # BLD AUTO: 7.9 X10E3/UL (ref 3.4–10.8)

## 2022-01-13 DIAGNOSIS — M19.90 ARTHRITIS: ICD-10-CM

## 2022-01-13 DIAGNOSIS — E89.0 POSTABLATIVE HYPOTHYROIDISM: ICD-10-CM

## 2022-01-13 DIAGNOSIS — F41.9 ANXIETY: ICD-10-CM

## 2022-01-13 RX ORDER — BUPROPION HYDROCHLORIDE 300 MG/1
300 TABLET ORAL EVERY MORNING
Qty: 90 TABLET | Refills: 2 | Status: SHIPPED | OUTPATIENT
Start: 2022-01-13 | End: 2022-09-02 | Stop reason: DRUGHIGH

## 2022-01-13 RX ORDER — LEVOTHYROXINE SODIUM 150 MCG
150 TABLET ORAL DAILY
Qty: 90 TABLET | Refills: 1 | Status: SHIPPED | OUTPATIENT
Start: 2022-01-13 | End: 2022-03-24

## 2022-03-07 DIAGNOSIS — R53.82 CHRONIC FATIGUE: ICD-10-CM

## 2022-03-07 DIAGNOSIS — Z13.220 SCREENING FOR HYPERLIPIDEMIA: ICD-10-CM

## 2022-03-07 DIAGNOSIS — E89.0 POSTABLATIVE HYPOTHYROIDISM: Primary | ICD-10-CM

## 2022-03-12 LAB
25(OH)D3+25(OH)D2 SERPL-MCNC: 31.4 NG/ML (ref 30–100)
ALBUMIN SERPL-MCNC: 4.6 G/DL (ref 3.8–4.8)
ALBUMIN/GLOB SERPL: 1.9 {RATIO} (ref 1.2–2.2)
ALP SERPL-CCNC: 74 IU/L (ref 44–121)
ALT SERPL-CCNC: 9 IU/L (ref 0–32)
AST SERPL-CCNC: 10 IU/L (ref 0–40)
BASOPHILS # BLD AUTO: 0.1 X10E3/UL (ref 0–0.2)
BASOPHILS NFR BLD AUTO: 1 %
BILIRUB SERPL-MCNC: 0.3 MG/DL (ref 0–1.2)
BUN SERPL-MCNC: 10 MG/DL (ref 6–20)
BUN/CREAT SERPL: 15 (ref 9–23)
CALCIUM SERPL-MCNC: 9.2 MG/DL (ref 8.7–10.2)
CHLORIDE SERPL-SCNC: 100 MMOL/L (ref 96–106)
CHOLEST SERPL-MCNC: 169 MG/DL (ref 100–199)
CHOLEST/HDLC SERPL: 3.2 RATIO (ref 0–4.4)
CO2 SERPL-SCNC: 26 MMOL/L (ref 20–29)
CREAT SERPL-MCNC: 0.66 MG/DL (ref 0.57–1)
EGFR GENE MUT ANL BLD/T: 119 ML/MIN/1.73
EOSINOPHIL # BLD AUTO: 0 X10E3/UL (ref 0–0.4)
EOSINOPHIL NFR BLD AUTO: 0 %
ERYTHROCYTE [DISTWIDTH] IN BLOOD BY AUTOMATED COUNT: 11.6 % (ref 11.7–15.4)
FOLATE SERPL-MCNC: 15.2 NG/ML
GLOBULIN SER CALC-MCNC: 2.4 G/DL (ref 1.5–4.5)
GLUCOSE SERPL-MCNC: 118 MG/DL (ref 65–99)
HCT VFR BLD AUTO: 37.4 % (ref 34–46.6)
HDLC SERPL-MCNC: 53 MG/DL
HGB BLD-MCNC: 12.5 G/DL (ref 11.1–15.9)
IMM GRANULOCYTES # BLD AUTO: 0 X10E3/UL (ref 0–0.1)
IMM GRANULOCYTES NFR BLD AUTO: 0 %
LDLC SERPL CALC-MCNC: 98 MG/DL (ref 0–99)
LYMPHOCYTES # BLD AUTO: 1.6 X10E3/UL (ref 0.7–3.1)
LYMPHOCYTES NFR BLD AUTO: 19 %
MCH RBC QN AUTO: 29.5 PG (ref 26.6–33)
MCHC RBC AUTO-ENTMCNC: 33.4 G/DL (ref 31.5–35.7)
MCV RBC AUTO: 88 FL (ref 79–97)
MONOCYTES # BLD AUTO: 0.5 X10E3/UL (ref 0.1–0.9)
MONOCYTES NFR BLD AUTO: 7 %
NEUTROPHILS # BLD AUTO: 6 X10E3/UL (ref 1.4–7)
NEUTROPHILS NFR BLD AUTO: 73 %
PLATELET # BLD AUTO: 258 X10E3/UL (ref 150–450)
POTASSIUM SERPL-SCNC: 4.1 MMOL/L (ref 3.5–5.2)
PROT SERPL-MCNC: 7 G/DL (ref 6–8.5)
RBC # BLD AUTO: 4.24 X10E6/UL (ref 3.77–5.28)
SODIUM SERPL-SCNC: 141 MMOL/L (ref 134–144)
T3 SERPL-MCNC: 105 NG/DL (ref 71–180)
T3FREE SERPL-MCNC: 3.1 PG/ML (ref 2–4.4)
T4 FREE SERPL-MCNC: 2.12 NG/DL (ref 0.82–1.77)
T4 SERPL-MCNC: 13.6 UG/DL (ref 4.5–12)
TRIGL SERPL-MCNC: 98 MG/DL (ref 0–149)
TSH SERPL DL<=0.005 MIU/L-ACNC: 0.84 UIU/ML (ref 0.45–4.5)
VIT B12 SERPL-MCNC: 271 PG/ML (ref 232–1245)
VLDLC SERPL CALC-MCNC: 18 MG/DL (ref 5–40)
WBC # BLD AUTO: 8.2 X10E3/UL (ref 3.4–10.8)

## 2022-03-22 ENCOUNTER — TELEPHONE (OUTPATIENT)
Dept: FAMILY MEDICINE CLINIC | Facility: CLINIC | Age: 32
End: 2022-03-22

## 2022-03-24 DIAGNOSIS — E89.0 POSTABLATIVE HYPOTHYROIDISM: ICD-10-CM

## 2022-03-24 DIAGNOSIS — E05.00 GRAVES' DISEASE: Primary | ICD-10-CM

## 2022-03-24 RX ORDER — LEVOTHYROXINE SODIUM 0.1 MG/1
100 TABLET ORAL DAILY
Qty: 30 TABLET | Refills: 12 | Status: SHIPPED | OUTPATIENT
Start: 2022-03-24 | End: 2022-09-07 | Stop reason: DRUGHIGH

## 2022-03-24 RX ORDER — LIOTHYRONINE SODIUM 5 UG/1
5 TABLET ORAL DAILY
Qty: 90 TABLET | Refills: 1 | Status: SHIPPED | OUTPATIENT
Start: 2022-03-24 | End: 2022-03-24 | Stop reason: SDUPTHER

## 2022-03-24 RX ORDER — LEVOTHYROXINE SODIUM 0.1 MG/1
100 TABLET ORAL DAILY
Qty: 30 TABLET | Refills: 12 | Status: SHIPPED | OUTPATIENT
Start: 2022-03-24 | End: 2022-03-24 | Stop reason: SDUPTHER

## 2022-03-24 RX ORDER — LIOTHYRONINE SODIUM 5 UG/1
5 TABLET ORAL DAILY
Qty: 90 TABLET | Refills: 2 | Status: SHIPPED | OUTPATIENT
Start: 2022-03-24

## 2022-08-18 NOTE — TELEPHONE ENCOUNTER
Caller: Brigida Campos (Self)    Best call back number: [phone] (H)    What test was performed: LAB WORK     When was the test performed: 03/11/22    Where was the test performed: IN OFFICE     Additional notes:     PATIENT WONDERING IF ANY CHANGES TO HER THYROID MEDICATION NEED TO BE MADE. SHE HASN'T BEEN CONTACTED ABOUT THESE RESULTS AND WOULD LIKE FOR A NURSE TO PLEASE CALL HER.   Tranexamic Acid Pregnancy And Lactation Text: It is unknown if this medication is safe during pregnancy or breast feeding.

## 2022-09-02 ENCOUNTER — OFFICE VISIT (OUTPATIENT)
Dept: FAMILY MEDICINE CLINIC | Facility: CLINIC | Age: 32
End: 2022-09-02

## 2022-09-02 ENCOUNTER — LAB (OUTPATIENT)
Dept: FAMILY MEDICINE CLINIC | Facility: CLINIC | Age: 32
End: 2022-09-02

## 2022-09-02 VITALS
HEART RATE: 63 BPM | OXYGEN SATURATION: 98 % | DIASTOLIC BLOOD PRESSURE: 80 MMHG | RESPIRATION RATE: 16 BRPM | HEIGHT: 62 IN | BODY MASS INDEX: 28.16 KG/M2 | SYSTOLIC BLOOD PRESSURE: 118 MMHG | WEIGHT: 153 LBS

## 2022-09-02 DIAGNOSIS — F41.9 ANXIETY: ICD-10-CM

## 2022-09-02 DIAGNOSIS — Z76.89 ENCOUNTER TO ESTABLISH CARE: Primary | ICD-10-CM

## 2022-09-02 DIAGNOSIS — E89.0 POSTABLATIVE HYPOTHYROIDISM: ICD-10-CM

## 2022-09-02 PROCEDURE — 99213 OFFICE O/P EST LOW 20 MIN: CPT | Performed by: NURSE PRACTITIONER

## 2022-09-02 PROCEDURE — 84439 ASSAY OF FREE THYROXINE: CPT | Performed by: NURSE PRACTITIONER

## 2022-09-02 PROCEDURE — 36415 COLL VENOUS BLD VENIPUNCTURE: CPT

## 2022-09-02 PROCEDURE — 84443 ASSAY THYROID STIM HORMONE: CPT | Performed by: NURSE PRACTITIONER

## 2022-09-02 RX ORDER — BUPROPION HYDROCHLORIDE 150 MG/1
150 TABLET ORAL DAILY
Qty: 90 TABLET | Refills: 3 | Status: SHIPPED | OUTPATIENT
Start: 2022-09-02 | End: 2023-03-03

## 2022-09-02 NOTE — PROGRESS NOTES
"Chief Complaint  Establish Care    Subjective          Brigida Campos presents to [facility]  History of Present Illness    Is a new patient, here today to establish care and needs thyroid levels checked  Was seeing Dr. Whyte    She also today would like to reduce her wellbutrin dose as she feels like the higher dose makes her irritable  Has been taking the wellbutrin once every 2-3 days    Current dose of levothyroxine is 100mcg with liothyronine 5mg - she is taking them regularly as prescribed    Review of Systems   Constitutional: Negative for appetite change and fever.   Respiratory: Negative.  Negative for chest tightness and shortness of breath.    Cardiovascular: Negative.  Negative for chest pain.        Notices that her heart races with anxiety attacks   Gastrointestinal: Negative.  Negative for constipation and diarrhea.   Genitourinary: Negative.  Negative for dysuria, frequency and urgency.   Neurological: Positive for headaches. Negative for dizziness and weakness.        Occasional headaches, relieved with ibuprofen or tylenol   Psychiatric/Behavioral: Positive for agitation and sleep disturbance. The patient is nervous/anxious.         Endorses increased anxiety and irritability with increased dose of wellbutrin    Sleep pattern is such that she sleeps poorly for a few nights and then will sleep really well     Objective   Vital Signs:  /80 (BP Location: Left arm, Patient Position: Sitting)   Pulse 63   Resp 16   Ht 157.5 cm (62\")   Wt 69.4 kg (153 lb)   SpO2 98%   BMI 27.98 kg/m²     BP Readings from Last 3 Encounters:   09/02/22 118/80   04/15/22 112/75   01/06/22 132/92        Wt Readings from Last 3 Encounters:   09/02/22 69.4 kg (153 lb)   04/15/22 65.8 kg (145 lb)   12/30/21 67.4 kg (148 lb 9.6 oz)        BMI is >= 25 and <30. (Overweight) The following options were offered after discussion;: exercise counseling/recommendations and nutrition " counseling/recommendations      Physical Exam  Vitals reviewed.   Constitutional:       Appearance: Normal appearance.   Neck:      Vascular: No carotid bruit.   Cardiovascular:      Rate and Rhythm: Normal rate and regular rhythm.      Pulses: Normal pulses.      Heart sounds: Normal heart sounds.   Pulmonary:      Effort: Pulmonary effort is normal.      Breath sounds: Normal breath sounds.   Musculoskeletal:      Cervical back: Neck supple.      Right lower leg: No edema.      Left lower leg: No edema.   Skin:     General: Skin is warm.   Neurological:      Mental Status: She is alert and oriented to person, place, and time.   Psychiatric:         Mood and Affect: Mood normal.         Behavior: Behavior normal.        Result Review :     CMP    CMP 1/11/22 3/11/22   Glucose 83 118 (A)   BUN 11 10   Creatinine 0.90 0.66   eGFR Non  Am 84    eGFR African Am 97    Sodium 142 141   Potassium 4.3 4.1   Chloride 101 100   Calcium 9.6 9.2   Total Protein 7.2 7.0   Albumin 4.5 4.6   Globulin 2.7 2.4   Total Bilirubin <0.2 0.3   Alkaline Phosphatase 68 74   AST (SGOT) 15 10   ALT (SGPT) 10 9   (A) Abnormal value       Comments are available for some flowsheets but are not being displayed.           CBC    CBC 1/11/22 3/11/22   WBC 7.9 8.2   RBC 4.56 4.24   Hemoglobin 13.8 12.5   Hematocrit 40.7 37.4   MCV 89 88   MCH 30.3 29.5   MCHC 33.9 33.4   RDW 11.8 11.6 (A)   Platelets 263 258   (A) Abnormal value            TSH    TSH 1/11/22 3/11/22   TSH 19.300 (A) 0.837   (A) Abnormal value                      Assessment and Plan    Diagnoses and all orders for this visit:    1. Encounter to establish care (Primary)    2. Postablative hypothyroidism  -     TSH Rfx On Abnormal To Free T4; Future    3. Anxiety  -     buPROPion XL (Wellbutrin XL) 150 MG 24 hr tablet; Take 1 tablet by mouth Daily.  Dispense: 90 tablet; Refill: 3           Follow Up   Return in about 6 months (around 3/2/2023) for Annual physical, recheck  thyroid.  Patient was given instructions and counseling regarding her condition or for health maintenance advice. Please see specific information pulled into the AVS if appropriate.

## 2022-09-03 LAB
T4 FREE SERPL-MCNC: 1.42 NG/DL (ref 0.93–1.7)
TSH SERPL DL<=0.05 MIU/L-ACNC: 6.15 UIU/ML (ref 0.27–4.2)

## 2022-09-07 RX ORDER — LEVOTHYROXINE SODIUM 0.12 MG/1
125 TABLET ORAL DAILY
Qty: 30 TABLET | Refills: 1 | Status: SHIPPED | OUTPATIENT
Start: 2022-09-07 | End: 2022-11-10 | Stop reason: SDUPTHER

## 2022-10-20 ENCOUNTER — OFFICE VISIT (OUTPATIENT)
Dept: FAMILY MEDICINE CLINIC | Facility: CLINIC | Age: 32
End: 2022-10-20

## 2022-10-20 ENCOUNTER — LAB (OUTPATIENT)
Dept: FAMILY MEDICINE CLINIC | Facility: CLINIC | Age: 32
End: 2022-10-20

## 2022-10-20 VITALS
RESPIRATION RATE: 16 BRPM | SYSTOLIC BLOOD PRESSURE: 118 MMHG | BODY MASS INDEX: 28.52 KG/M2 | HEIGHT: 62 IN | WEIGHT: 155 LBS | DIASTOLIC BLOOD PRESSURE: 70 MMHG | HEART RATE: 68 BPM

## 2022-10-20 DIAGNOSIS — B37.31 VAGINAL YEAST INFECTION: ICD-10-CM

## 2022-10-20 DIAGNOSIS — R35.0 URINARY FREQUENCY: Primary | ICD-10-CM

## 2022-10-20 DIAGNOSIS — R35.0 URINARY FREQUENCY: ICD-10-CM

## 2022-10-20 LAB
BACTERIA UR QL AUTO: NORMAL /HPF
BILIRUB UR QL STRIP: NEGATIVE
CLARITY UR: CLEAR
COLOR UR: YELLOW
GLUCOSE UR STRIP-MCNC: NEGATIVE MG/DL
HGB UR QL STRIP.AUTO: NEGATIVE
HYALINE CASTS UR QL AUTO: NORMAL /LPF
KETONES UR QL STRIP: NEGATIVE
LEUKOCYTE ESTERASE UR QL STRIP.AUTO: NEGATIVE
NITRITE UR QL STRIP: NEGATIVE
PH UR STRIP.AUTO: 7 [PH] (ref 5–8)
PROT UR QL STRIP: NEGATIVE
RBC # UR STRIP: NORMAL /HPF
REF LAB TEST METHOD: NORMAL
SP GR UR STRIP: 1.01 (ref 1–1.03)
SQUAMOUS #/AREA URNS HPF: NORMAL /HPF
UROBILINOGEN UR QL STRIP: NORMAL
WBC # UR STRIP: NORMAL /HPF

## 2022-10-20 PROCEDURE — 81001 URINALYSIS AUTO W/SCOPE: CPT | Performed by: NURSE PRACTITIONER

## 2022-10-20 PROCEDURE — 99213 OFFICE O/P EST LOW 20 MIN: CPT | Performed by: NURSE PRACTITIONER

## 2022-10-20 RX ORDER — FLUCONAZOLE 150 MG/1
150 TABLET ORAL ONCE
Qty: 2 TABLET | Refills: 0 | Status: SHIPPED | OUTPATIENT
Start: 2022-10-20 | End: 2022-10-20

## 2022-10-20 RX ORDER — NITROFURANTOIN 25; 75 MG/1; MG/1
100 CAPSULE ORAL 2 TIMES DAILY
Qty: 10 CAPSULE | Refills: 0 | Status: SHIPPED | OUTPATIENT
Start: 2022-10-20 | End: 2023-03-03

## 2022-10-20 NOTE — PROGRESS NOTES
"Chief Complaint  Vaginitis    Subjective          Brigida Campos presents to [facility]  History of Present Illness    Is here today with c/o yeast infection and UTI symptoms  As well as low back pain the other day which has resolved  She does endorse urinary frequency  She has some white discharge with external itching and burning    Review of Systems   Constitutional: Negative.  Negative for appetite change, fatigue and fever.   Gastrointestinal:        On Tuesday she had some low abdominal pain and sloe low back pain, both of which have resolved   Genitourinary: Positive for frequency, urgency and vaginal discharge. Negative for hematuria, pelvic pain and vaginal pain.     Objective   Vital Signs:  /70 (BP Location: Left arm, Patient Position: Sitting)   Pulse 68   Resp 16   Ht 157.5 cm (62\")   Wt 70.3 kg (155 lb)   BMI 28.35 kg/m²     BP Readings from Last 3 Encounters:   10/20/22 118/70   09/02/22 118/80   04/15/22 112/75        Wt Readings from Last 3 Encounters:   10/20/22 70.3 kg (155 lb)   09/02/22 69.4 kg (153 lb)   04/15/22 65.8 kg (145 lb)              Physical Exam  Vitals reviewed.   Constitutional:       Appearance: Normal appearance.   Cardiovascular:      Rate and Rhythm: Normal rate and regular rhythm.      Pulses: Normal pulses.      Heart sounds: Normal heart sounds.   Pulmonary:      Effort: Pulmonary effort is normal.      Breath sounds: Normal breath sounds.   Abdominal:      Tenderness: There is abdominal tenderness in the suprapubic area.   Skin:     General: Skin is warm.   Neurological:      Mental Status: She is alert and oriented to person, place, and time.        Result Review :                 Assessment and Plan    Diagnoses and all orders for this visit:    1. Urinary frequency (Primary)  -     Urinalysis With Microscopic - Urine, Clean Catch; Future  -     nitrofurantoin, macrocrystal-monohydrate, (Macrobid) 100 MG capsule; Take 1 capsule " by mouth 2 (Two) Times a Day.  Dispense: 10 capsule; Refill: 0    2. Vaginal yeast infection  -     fluconazole (Diflucan) 150 MG tablet; Take 1 tablet by mouth 1 (One) Time for 1 dose. May repeat in 4 days if symptoms persist or return  Dispense: 2 tablet; Refill: 0           Follow Up   Return if symptoms worsen or fail to improve.  Patient was given instructions and counseling regarding her condition or for health maintenance advice. Please see specific information pulled into the AVS if appropriate.

## 2022-11-09 ENCOUNTER — LAB (OUTPATIENT)
Dept: FAMILY MEDICINE CLINIC | Facility: CLINIC | Age: 32
End: 2022-11-09

## 2022-11-09 DIAGNOSIS — E89.0 POSTABLATIVE HYPOTHYROIDISM: ICD-10-CM

## 2022-11-09 LAB — TSH SERPL DL<=0.05 MIU/L-ACNC: 2.79 UIU/ML (ref 0.27–4.2)

## 2022-11-09 PROCEDURE — 84443 ASSAY THYROID STIM HORMONE: CPT | Performed by: NURSE PRACTITIONER

## 2022-11-09 PROCEDURE — 36415 COLL VENOUS BLD VENIPUNCTURE: CPT

## 2022-11-10 DIAGNOSIS — E89.0 POSTABLATIVE HYPOTHYROIDISM: ICD-10-CM

## 2022-11-10 RX ORDER — LEVOTHYROXINE SODIUM 0.12 MG/1
125 TABLET ORAL DAILY
Qty: 90 TABLET | Refills: 3 | Status: SHIPPED | OUTPATIENT
Start: 2022-11-10

## 2023-01-02 DIAGNOSIS — M19.90 ARTHRITIS: ICD-10-CM

## 2023-01-11 ENCOUNTER — PATIENT MESSAGE (OUTPATIENT)
Dept: FAMILY MEDICINE CLINIC | Facility: CLINIC | Age: 33
End: 2023-01-11
Payer: COMMERCIAL

## 2023-01-11 DIAGNOSIS — M19.90 ARTHRITIS: ICD-10-CM

## 2023-01-11 NOTE — TELEPHONE ENCOUNTER
From: Brigida Campos  To: Jazmyn Clemente  Sent: 1/11/2023 1:36 PM EST  Subject: Refill    Can you please send in refill for my diclofenac 50 mg medicine? I don’t have any refills through you were I switched doctors. Thank you!

## 2023-03-03 ENCOUNTER — OFFICE VISIT (OUTPATIENT)
Dept: FAMILY MEDICINE CLINIC | Facility: CLINIC | Age: 33
End: 2023-03-03
Payer: MEDICAID

## 2023-03-03 VITALS
HEART RATE: 79 BPM | WEIGHT: 164 LBS | BODY MASS INDEX: 30.18 KG/M2 | RESPIRATION RATE: 16 BRPM | SYSTOLIC BLOOD PRESSURE: 118 MMHG | OXYGEN SATURATION: 96 % | HEIGHT: 62 IN | DIASTOLIC BLOOD PRESSURE: 80 MMHG

## 2023-03-03 DIAGNOSIS — Z00.00 PHYSICAL EXAM: Primary | ICD-10-CM

## 2023-03-03 DIAGNOSIS — R10.84 GENERALIZED ABDOMINAL CRAMPING: ICD-10-CM

## 2023-03-03 DIAGNOSIS — F41.9 ANXIETY: ICD-10-CM

## 2023-03-03 PROBLEM — J06.9 UPPER RESPIRATORY INFECTION: Status: RESOLVED | Noted: 2021-04-12 | Resolved: 2023-03-03

## 2023-03-03 PROBLEM — B37.9 YEAST INFECTION: Status: RESOLVED | Noted: 2021-04-12 | Resolved: 2023-03-03

## 2023-03-03 PROBLEM — J01.90 ACUTE SINUSITIS: Status: RESOLVED | Noted: 2021-04-12 | Resolved: 2023-03-03

## 2023-03-03 PROBLEM — N76.0 VAGINITIS: Status: RESOLVED | Noted: 2021-04-12 | Resolved: 2023-03-03

## 2023-03-03 PROBLEM — J02.9 SORE THROAT: Status: RESOLVED | Noted: 2021-04-12 | Resolved: 2023-03-03

## 2023-03-03 PROBLEM — Z72.0 TOBACCO USE: Status: RESOLVED | Noted: 2020-02-05 | Resolved: 2023-03-03

## 2023-03-03 PROBLEM — E03.9 HYPOTHYROIDISM: Status: RESOLVED | Noted: 2021-04-12 | Resolved: 2023-03-03

## 2023-03-03 PROCEDURE — 2014F MENTAL STATUS ASSESS: CPT | Performed by: NURSE PRACTITIONER

## 2023-03-03 PROCEDURE — 99395 PREV VISIT EST AGE 18-39: CPT | Performed by: NURSE PRACTITIONER

## 2023-03-03 PROCEDURE — 3008F BODY MASS INDEX DOCD: CPT | Performed by: NURSE PRACTITIONER

## 2023-03-03 RX ORDER — VENLAFAXINE HYDROCHLORIDE 37.5 MG/1
37.5 CAPSULE, EXTENDED RELEASE ORAL DAILY
Qty: 30 CAPSULE | Refills: 1 | Status: SHIPPED | OUTPATIENT
Start: 2023-03-03

## 2023-03-03 NOTE — PROGRESS NOTES
Chief Complaint  Annual Exam and Anxiety (Medication change)    Subjective          Brigida Patel presents to [facility]  History of Present Illness    Is here today for annual physical  She denies any new problems or concerns    Has h/o thyroiditis, hypothyroidism, thyroid nodule, IBS, arthritis, anxiety/depression, asthmatic bronchitis  Current medicines are albuterol, wellbutrin, diclofenac, levothyroxine, cytomel, bentyl    She would like to try a different medicine for depression and anxiety, she has weaned herself off of the wellbutrin some months ago  She has tried multiple meds in the past including celexa, Prozac, lexapro, and zoloft  She did not like prozac    She has recently returned to have a regular exercise routine    She has been having to eat only salad, gets abdominal cramping and bloating whenever she eats - has been using bentyl with some relief  She has h/o IBS but has not ever seen a GI doc, her previous pcp has managed with bentyl    Review of Systems   Constitutional: Negative.  Negative for appetite change, fatigue and fever.   Respiratory: Negative.  Negative for cough, shortness of breath and wheezing.    Cardiovascular: Negative.  Negative for chest pain and palpitations.   Gastrointestinal: Positive for abdominal pain, constipation and diarrhea.        Abdominal cramping bloating after eating  Has both constipation and diarrhea, denies any normal bowel movements  Notices that sometimes the stools are the consistency of pudding   Genitourinary: Negative.  Negative for dysuria, frequency and urgency.   Musculoskeletal: Negative.  Negative for arthralgias, back pain and myalgias.   Allergic/Immunologic: Positive for environmental allergies.   Neurological: Positive for headaches. Negative for dizziness and weakness.        Endorses fairly frequent headaches, uses ibuprofen with relief most of the time   Psychiatric/Behavioral: Positive for dysphoric mood.  "The patient is nervous/anxious.         Feels very depressed, irritable, increased anxiety and panic attacks  Has tried a lot of different medicine over the years    She has increased personal stress as her father has lung cancer and she tells me that he is dying         Objective   Vital Signs:  /80 (BP Location: Left arm, Patient Position: Sitting)   Pulse 79   Resp 16   Ht 157.5 cm (62.01\")   Wt 74.4 kg (164 lb)   SpO2 96%   BMI 29.99 kg/m²     BP Readings from Last 3 Encounters:   03/03/23 118/80   10/20/22 118/70   09/02/22 118/80        Wt Readings from Last 3 Encounters:   03/03/23 74.4 kg (164 lb)   10/20/22 70.3 kg (155 lb)   09/02/22 69.4 kg (153 lb)              Physical Exam  Vitals reviewed.   Constitutional:       Appearance: Normal appearance.   HENT:      Right Ear: Tympanic membrane, ear canal and external ear normal.      Left Ear: Tympanic membrane, ear canal and external ear normal.      Nose: Nose normal.      Mouth/Throat:      Mouth: Mucous membranes are moist.      Pharynx: Oropharynx is clear.   Eyes:      Extraocular Movements: Extraocular movements intact.   Neck:      Thyroid: No thyromegaly.      Vascular: No carotid bruit.   Cardiovascular:      Rate and Rhythm: Normal rate and regular rhythm.      Pulses: Normal pulses.      Heart sounds: Normal heart sounds.   Pulmonary:      Effort: Pulmonary effort is normal.      Breath sounds: Normal breath sounds.   Abdominal:      General: Bowel sounds are normal.      Palpations: Abdomen is soft.   Musculoskeletal:      Cervical back: Neck supple. No tenderness.      Right lower leg: No edema.      Left lower leg: No edema.   Lymphadenopathy:      Cervical: No cervical adenopathy.   Skin:     General: Skin is warm.   Neurological:      Mental Status: She is alert and oriented to person, place, and time.        Result Review :                 Assessment and Plan    Diagnoses and all orders for this visit:    1. Physical exam " (Primary)  -     Comprehensive Metabolic Panel; Future  -     CBC (No Diff); Future  -     Urinalysis With Culture If Indicated - Urine, Clean Catch; Future  -     Lipid Panel; Future    2. Generalized abdominal cramping  -     Ambulatory Referral to Gastroenterology    3. Anxiety  -     venlafaxine XR (Effexor XR) 37.5 MG 24 hr capsule; Take 1 capsule by mouth Daily.  Dispense: 30 capsule; Refill: 1           Follow Up   Return in about 4 weeks (around 3/31/2023) for Recheck.  Patient was given instructions and counseling regarding her condition or for health maintenance advice. Please see specific information pulled into the AVS if appropriate.

## 2023-04-05 DIAGNOSIS — E89.0 POSTABLATIVE HYPOTHYROIDISM: ICD-10-CM

## 2023-04-05 RX ORDER — LIOTHYRONINE SODIUM 5 UG/1
5 TABLET ORAL DAILY
Qty: 90 TABLET | Refills: 2 | OUTPATIENT
Start: 2023-04-05

## 2023-04-07 ENCOUNTER — TELEPHONE (OUTPATIENT)
Dept: FAMILY MEDICINE CLINIC | Facility: CLINIC | Age: 33
End: 2023-04-07
Payer: MEDICAID

## 2023-04-07 DIAGNOSIS — E89.0 POSTABLATIVE HYPOTHYROIDISM: ICD-10-CM

## 2023-04-07 RX ORDER — LIOTHYRONINE SODIUM 5 UG/1
5 TABLET ORAL DAILY
Qty: 90 TABLET | Refills: 2 | Status: SHIPPED | OUTPATIENT
Start: 2023-04-07

## 2023-04-07 NOTE — TELEPHONE ENCOUNTER
----- Message from Nikki Thorpe MA sent at 4/6/2023 12:34 PM EDT -----  Regarding: FW: Levithyroine  Contact: [phone]    ----- Message -----  From: Brigida Patel  Sent: 4/6/2023  12:18 PM EDT  To: [facility]  Subject: Levithyroine                                     Can you send in a refill for my levithyorine please

## 2023-04-13 ENCOUNTER — OFFICE VISIT (OUTPATIENT)
Dept: FAMILY MEDICINE CLINIC | Facility: CLINIC | Age: 33
End: 2023-04-13
Payer: MEDICAID

## 2023-04-13 VITALS
HEART RATE: 70 BPM | OXYGEN SATURATION: 99 % | WEIGHT: 164 LBS | SYSTOLIC BLOOD PRESSURE: 118 MMHG | HEIGHT: 62 IN | DIASTOLIC BLOOD PRESSURE: 74 MMHG | BODY MASS INDEX: 30.18 KG/M2 | RESPIRATION RATE: 16 BRPM

## 2023-04-13 DIAGNOSIS — F41.9 ANXIETY: Primary | ICD-10-CM

## 2023-04-13 DIAGNOSIS — L30.9 ECZEMA, UNSPECIFIED TYPE: ICD-10-CM

## 2023-04-13 DIAGNOSIS — L85.3 DRY SKIN DERMATITIS: ICD-10-CM

## 2023-04-13 PROCEDURE — 99213 OFFICE O/P EST LOW 20 MIN: CPT | Performed by: NURSE PRACTITIONER

## 2023-04-13 PROCEDURE — 1160F RVW MEDS BY RX/DR IN RCRD: CPT | Performed by: NURSE PRACTITIONER

## 2023-04-13 PROCEDURE — 1159F MED LIST DOCD IN RCRD: CPT | Performed by: NURSE PRACTITIONER

## 2023-04-13 RX ORDER — AMMONIUM LACTATE 12 G/100G
LOTION TOPICAL AS NEEDED
Qty: 225 G | Refills: 1 | Status: SHIPPED | OUTPATIENT
Start: 2023-04-13 | End: 2023-04-13 | Stop reason: SDUPTHER

## 2023-04-13 RX ORDER — VENLAFAXINE HYDROCHLORIDE 37.5 MG/1
37.5 CAPSULE, EXTENDED RELEASE ORAL DAILY
Qty: 90 CAPSULE | Refills: 3 | Status: SHIPPED | OUTPATIENT
Start: 2023-04-13

## 2023-04-13 RX ORDER — AMMONIUM LACTATE 12 G/100G
LOTION TOPICAL 2 TIMES DAILY PRN
Qty: 225 G | Refills: 1 | Status: SHIPPED | OUTPATIENT
Start: 2023-04-13

## 2023-04-13 NOTE — PROGRESS NOTES
"Chief Complaint  Follow-up (medications)    Subjective          Brigida Patel presents to [facility]  History of Present Illness    Is here today for follow up anxiety    Started venlafaxine at last visit     Today she tells me that the medication is working really well for her mood  She does endorse that she has some mild side effects for the first three days but then has not had any problems since  She wishes to continue using this medication    Also today she has some patchy, pruritic eczema lesions and would like something to use on them    Review of Systems   Constitutional: Negative.  Negative for appetite change, fatigue and fever.   Respiratory: Negative.  Negative for shortness of breath.    Cardiovascular: Negative.  Negative for chest pain and palpitations.   Neurological: Negative for dizziness, weakness and headaches.   Psychiatric/Behavioral: Negative for dysphoric mood. The patient is not nervous/anxious.         Mood is greatly improved with venlafaxine       Objective   Vital Signs:  /74 (BP Location: Left arm, Patient Position: Sitting)   Pulse 70   Resp 16   Ht 157.5 cm (62.01\")   Wt 74.4 kg (164 lb)   SpO2 99%   BMI 29.99 kg/m²     BP Readings from Last 3 Encounters:   04/13/23 118/74   03/03/23 118/80   10/20/22 118/70        Wt Readings from Last 3 Encounters:   04/13/23 74.4 kg (164 lb)   03/03/23 74.4 kg (164 lb)   10/20/22 70.3 kg (155 lb)              Physical Exam  Vitals reviewed.   Constitutional:       Appearance: Normal appearance.   Cardiovascular:      Rate and Rhythm: Normal rate and regular rhythm.      Pulses: Normal pulses.      Heart sounds: Normal heart sounds.   Pulmonary:      Effort: Pulmonary effort is normal.      Breath sounds: Normal breath sounds.   Skin:     General: Skin is warm and dry.   Neurological:      Mental Status: She is alert and oriented to person, place, and time.        Result Review :               "   Assessment and Plan    Diagnoses and all orders for this visit:    1. Anxiety (Primary)  -     venlafaxine XR (Effexor XR) 37.5 MG 24 hr capsule; Take 1 capsule by mouth Daily.  Dispense: 90 capsule; Refill: 3    2. Eczema, unspecified type  -     triamcinolone (KENALOG) 0.1 % ointment; Apply 1 application topically to the appropriate area as directed 2 (Two) Times a Day. As needed for itching  Dispense: 30 g; Refill: 1    3. Dry skin dermatitis  -     ammonium lactate (AmLactin) 12 % lotion; Apply  topically to the appropriate area as directed As Needed for Dry Skin.  Dispense: 225 g; Refill: 1             Follow Up   Return as needed, for Next scheduled follow up.  Patient was given instructions and counseling regarding her condition or for health maintenance advice. Please see specific information pulled into the AVS if appropriate.

## 2023-05-03 DIAGNOSIS — M19.90 ARTHRITIS: ICD-10-CM

## 2023-06-05 ENCOUNTER — OFFICE (OUTPATIENT)
Dept: URBAN - METROPOLITAN AREA CLINIC 64 | Facility: CLINIC | Age: 33
End: 2023-06-05

## 2023-06-05 VITALS
HEIGHT: 62 IN | DIASTOLIC BLOOD PRESSURE: 58 MMHG | SYSTOLIC BLOOD PRESSURE: 101 MMHG | HEART RATE: 65 BPM | WEIGHT: 162 LBS

## 2023-06-05 DIAGNOSIS — K58.9 IRRITABLE BOWEL SYNDROME WITHOUT DIARRHEA: ICD-10-CM

## 2023-06-05 PROCEDURE — 99204 OFFICE O/P NEW MOD 45 MIN: CPT | Performed by: INTERNAL MEDICINE

## 2023-06-05 RX ORDER — DICYCLOMINE HYDROCHLORIDE 20 MG/1
40 TABLET ORAL
Qty: 60 | Refills: 11 | Status: ACTIVE

## 2023-10-18 ENCOUNTER — PATIENT MESSAGE (OUTPATIENT)
Dept: FAMILY MEDICINE CLINIC | Facility: CLINIC | Age: 33
End: 2023-10-18
Payer: COMMERCIAL

## 2023-10-18 DIAGNOSIS — F33.1 MODERATE EPISODE OF RECURRENT MAJOR DEPRESSIVE DISORDER: Primary | ICD-10-CM

## 2023-10-18 DIAGNOSIS — F41.9 ANXIETY: ICD-10-CM

## 2023-10-19 RX ORDER — VENLAFAXINE HYDROCHLORIDE 75 MG/1
75 CAPSULE, EXTENDED RELEASE ORAL DAILY
Qty: 90 CAPSULE | Refills: 1 | Status: SHIPPED | OUTPATIENT
Start: 2023-10-19 | End: 2024-04-16

## 2023-10-19 NOTE — TELEPHONE ENCOUNTER
From: Brigida Patel  To: Jazmyn Almeida  Sent: 10/18/2023 6:54 PM EDT  Subject: Effexor    I was wondering if we could up the mg of my Effexor? It’s helping still but not as well.

## 2023-10-27 DIAGNOSIS — M19.90 ARTHRITIS: ICD-10-CM

## 2024-01-10 ENCOUNTER — LAB (OUTPATIENT)
Dept: FAMILY MEDICINE CLINIC | Facility: CLINIC | Age: 34
End: 2024-01-10
Payer: COMMERCIAL

## 2024-01-10 ENCOUNTER — OFFICE VISIT (OUTPATIENT)
Dept: FAMILY MEDICINE CLINIC | Facility: CLINIC | Age: 34
End: 2024-01-10
Payer: COMMERCIAL

## 2024-01-10 VITALS
SYSTOLIC BLOOD PRESSURE: 118 MMHG | RESPIRATION RATE: 20 BRPM | HEIGHT: 62 IN | WEIGHT: 154 LBS | DIASTOLIC BLOOD PRESSURE: 78 MMHG | OXYGEN SATURATION: 98 % | HEART RATE: 88 BPM | BODY MASS INDEX: 28.34 KG/M2

## 2024-01-10 DIAGNOSIS — E89.0 POSTABLATIVE HYPOTHYROIDISM: ICD-10-CM

## 2024-01-10 DIAGNOSIS — Z00.00 ANNUAL PHYSICAL EXAM: ICD-10-CM

## 2024-01-10 DIAGNOSIS — Z00.00 ANNUAL PHYSICAL EXAM: Primary | ICD-10-CM

## 2024-01-10 LAB
25(OH)D3 SERPL-MCNC: 24.7 NG/ML (ref 30–100)
ALBUMIN SERPL-MCNC: 4.6 G/DL (ref 3.5–5.2)
ALBUMIN/GLOB SERPL: 1.9 G/DL
ALP SERPL-CCNC: 72 U/L (ref 39–117)
ALT SERPL W P-5'-P-CCNC: 11 U/L (ref 1–33)
ANION GAP SERPL CALCULATED.3IONS-SCNC: 10 MMOL/L (ref 5–15)
AST SERPL-CCNC: 11 U/L (ref 1–32)
BILIRUB SERPL-MCNC: 0.2 MG/DL (ref 0–1.2)
BUN SERPL-MCNC: 11 MG/DL (ref 6–20)
BUN/CREAT SERPL: 13.9 (ref 7–25)
CALCIUM SPEC-SCNC: 9.5 MG/DL (ref 8.6–10.5)
CHLORIDE SERPL-SCNC: 103 MMOL/L (ref 98–107)
CHOLEST SERPL-MCNC: 207 MG/DL (ref 0–200)
CO2 SERPL-SCNC: 28 MMOL/L (ref 22–29)
CREAT SERPL-MCNC: 0.79 MG/DL (ref 0.57–1)
DEPRECATED RDW RBC AUTO: 37.6 FL (ref 37–54)
EGFRCR SERPLBLD CKD-EPI 2021: 100.2 ML/MIN/1.73
ERYTHROCYTE [DISTWIDTH] IN BLOOD BY AUTOMATED COUNT: 11.8 % (ref 12.3–15.4)
GLOBULIN UR ELPH-MCNC: 2.4 GM/DL
GLUCOSE SERPL-MCNC: 105 MG/DL (ref 65–99)
HCT VFR BLD AUTO: 42.5 % (ref 34–46.6)
HDLC SERPL-MCNC: 51 MG/DL (ref 40–60)
HGB BLD-MCNC: 13.6 G/DL (ref 12–15.9)
LDLC SERPL CALC-MCNC: 140 MG/DL (ref 0–100)
LDLC/HDLC SERPL: 2.71 {RATIO}
MCH RBC QN AUTO: 27.9 PG (ref 26.6–33)
MCHC RBC AUTO-ENTMCNC: 32 G/DL (ref 31.5–35.7)
MCV RBC AUTO: 87.3 FL (ref 79–97)
PLATELET # BLD AUTO: 293 10*3/MM3 (ref 140–450)
PMV BLD AUTO: 11.1 FL (ref 6–12)
POTASSIUM SERPL-SCNC: 3.9 MMOL/L (ref 3.5–5.2)
PROT SERPL-MCNC: 7 G/DL (ref 6–8.5)
RBC # BLD AUTO: 4.87 10*6/MM3 (ref 3.77–5.28)
SODIUM SERPL-SCNC: 141 MMOL/L (ref 136–145)
T4 FREE SERPL-MCNC: 1.73 NG/DL (ref 0.93–1.7)
TRIGL SERPL-MCNC: 88 MG/DL (ref 0–150)
TSH SERPL DL<=0.05 MIU/L-ACNC: 0.1 UIU/ML (ref 0.27–4.2)
VLDLC SERPL-MCNC: 16 MG/DL (ref 5–40)
WBC NRBC COR # BLD AUTO: 8.2 10*3/MM3 (ref 3.4–10.8)

## 2024-01-10 PROCEDURE — 99395 PREV VISIT EST AGE 18-39: CPT | Performed by: NURSE PRACTITIONER

## 2024-01-10 PROCEDURE — 80061 LIPID PANEL: CPT | Performed by: NURSE PRACTITIONER

## 2024-01-10 PROCEDURE — 82306 VITAMIN D 25 HYDROXY: CPT | Performed by: NURSE PRACTITIONER

## 2024-01-10 PROCEDURE — 80050 GENERAL HEALTH PANEL: CPT | Performed by: NURSE PRACTITIONER

## 2024-01-10 PROCEDURE — 84439 ASSAY OF FREE THYROXINE: CPT | Performed by: NURSE PRACTITIONER

## 2024-01-10 PROCEDURE — 36415 COLL VENOUS BLD VENIPUNCTURE: CPT

## 2024-01-10 NOTE — PROGRESS NOTES
"Chief Complaint  Annual Exam    Subjective          Brigida Patel presents to [facility]  History of Present Illness    Is here today for annual physical    Has h/o hypothyroidism, ibs, depression, anxiety, arthritis    Her current medicines are diclofenac, bentyl, levothyroxine, liothyronine, venlafaxine    Diet is reported to be somewhat healthy, has not been eating much since she has been going through this divorce    Is not currently exercising regularly     She is in the process of getting  and her  has decided he does not want to see their 5 year old daughter, she is not currently seeing a therapist, have recommended that she consider doing so    Is not fasting for labs, had \"mini pancakes\" at lunch time    Review of Systems   Constitutional:  Positive for appetite change and fatigue. Negative for fever.   Respiratory: Negative.  Negative for shortness of breath.    Cardiovascular: Negative.  Negative for chest pain and palpitations.   Gastrointestinal: Negative.  Negative for abdominal pain, constipation and diarrhea.        Her stomach problems have improved since her  left   Genitourinary: Negative.    Musculoskeletal: Negative.    Neurological:  Positive for dizziness and headaches.        Has been having headaches in the evening for the past few weeks    She had an episode of dizziness at work a couple of weeks ago followed by being sick with her stomach the following day, this has not happened again since that time   Psychiatric/Behavioral:  Positive for dysphoric mood and sleep disturbance. The patient is not nervous/anxious.         She endorsees that her mood is fair with the venlafaxine, she is not sleepig well all fo the time but tells me that it is kind of chronic     Objective   Vital Signs:  /78 (BP Location: Left arm, Patient Position: Sitting, Cuff Size: Adult)   Pulse 88   Resp 20   Ht 157.5 cm (62.01\")   Wt 69.9 kg (154 lb)   " SpO2 98%   BMI 28.16 kg/m²     BP Readings from Last 3 Encounters:   01/10/24 118/78   04/13/23 118/74   03/03/23 118/80        Wt Readings from Last 3 Encounters:   01/10/24 69.9 kg (154 lb)   04/13/23 74.4 kg (164 lb)   03/03/23 74.4 kg (164 lb)        BMI is >= 25 and <30. (Overweight) The following options were offered after discussion;: exercise counseling/recommendations and nutrition counseling/recommendations      Physical Exam  Vitals reviewed.   Constitutional:       Appearance: Normal appearance.   HENT:      Right Ear: Tympanic membrane, ear canal and external ear normal.      Left Ear: Tympanic membrane, ear canal and external ear normal.      Nose: Nose normal.      Mouth/Throat:      Mouth: Mucous membranes are moist.      Pharynx: Oropharynx is clear.   Eyes:      Extraocular Movements: Extraocular movements intact.   Neck:      Thyroid: No thyromegaly.      Vascular: No carotid bruit.   Cardiovascular:      Rate and Rhythm: Normal rate and regular rhythm.      Pulses: Normal pulses.      Heart sounds: Normal heart sounds.   Pulmonary:      Effort: Pulmonary effort is normal.      Breath sounds: Normal breath sounds.   Abdominal:      General: Bowel sounds are normal.      Palpations: Abdomen is soft.      Tenderness: There is no abdominal tenderness. There is no right CVA tenderness or left CVA tenderness.   Musculoskeletal:      Cervical back: Neck supple. No tenderness.      Right lower leg: No edema.      Left lower leg: No edema.   Lymphadenopathy:      Cervical: No cervical adenopathy.   Skin:     General: Skin is warm.   Neurological:      Mental Status: She is alert and oriented to person, place, and time.   Psychiatric:         Mood and Affect: Mood normal.         Behavior: Behavior normal.        Result Review :                 Assessment and Plan    Diagnoses and all orders for this visit:    1. Annual physical exam (Primary)  -     Comprehensive Metabolic Panel; Future  -     CBC (No  Diff); Future  -     Lipid Panel; Future  -     TSH Rfx On Abnormal To Free T4; Future  -     Vitamin D,25-Hydroxy; Future           Follow Up   Return in about 1 year (around 1/10/2025), or as needed, for Annual physical.  Patient was given instructions and counseling regarding her condition or for health maintenance advice. Please see specific information pulled into the AVS if appropriate.

## 2024-01-11 RX ORDER — LEVOTHYROXINE SODIUM 0.1 MG/1
100 TABLET ORAL DAILY
Qty: 30 TABLET | Refills: 1 | Status: SHIPPED | OUTPATIENT
Start: 2024-01-11

## 2024-02-02 DIAGNOSIS — E89.0 POSTABLATIVE HYPOTHYROIDISM: ICD-10-CM

## 2024-02-02 RX ORDER — LIOTHYRONINE SODIUM 5 UG/1
5 TABLET ORAL DAILY
Qty: 90 TABLET | Refills: 2 | Status: SHIPPED | OUTPATIENT
Start: 2024-02-02

## 2024-02-02 RX ORDER — LEVOTHYROXINE SODIUM 0.12 MG/1
125 TABLET ORAL DAILY
Qty: 90 TABLET | Refills: 3 | Status: SHIPPED | OUTPATIENT
Start: 2024-02-02

## 2024-03-27 DIAGNOSIS — F41.9 ANXIETY: ICD-10-CM

## 2024-03-27 DIAGNOSIS — M19.90 ARTHRITIS: ICD-10-CM

## 2024-03-27 DIAGNOSIS — E89.0 POSTABLATIVE HYPOTHYROIDISM: ICD-10-CM

## 2024-03-27 DIAGNOSIS — F33.1 MODERATE EPISODE OF RECURRENT MAJOR DEPRESSIVE DISORDER: ICD-10-CM

## 2024-03-28 RX ORDER — LEVOTHYROXINE SODIUM 0.1 MG/1
100 TABLET ORAL DAILY
Qty: 30 TABLET | Refills: 1 | Status: SHIPPED | OUTPATIENT
Start: 2024-03-28

## 2024-03-28 RX ORDER — VENLAFAXINE HYDROCHLORIDE 75 MG/1
75 CAPSULE, EXTENDED RELEASE ORAL DAILY
Qty: 90 CAPSULE | Refills: 1 | Status: SHIPPED | OUTPATIENT
Start: 2024-03-28

## 2024-04-23 DIAGNOSIS — E89.0 POSTABLATIVE HYPOTHYROIDISM: ICD-10-CM

## 2024-04-23 RX ORDER — LEVOTHYROXINE SODIUM 0.1 MG/1
100 TABLET ORAL DAILY
Qty: 90 TABLET | Refills: 1 | Status: SHIPPED | OUTPATIENT
Start: 2024-04-23

## 2024-05-01 ENCOUNTER — OFFICE VISIT (OUTPATIENT)
Dept: FAMILY MEDICINE CLINIC | Facility: CLINIC | Age: 34
End: 2024-05-01
Payer: COMMERCIAL

## 2024-05-01 ENCOUNTER — LAB (OUTPATIENT)
Dept: FAMILY MEDICINE CLINIC | Facility: CLINIC | Age: 34
End: 2024-05-01
Payer: COMMERCIAL

## 2024-05-01 VITALS
HEART RATE: 71 BPM | BODY MASS INDEX: 28.89 KG/M2 | RESPIRATION RATE: 18 BRPM | HEIGHT: 62 IN | WEIGHT: 157 LBS | SYSTOLIC BLOOD PRESSURE: 124 MMHG | DIASTOLIC BLOOD PRESSURE: 74 MMHG | OXYGEN SATURATION: 97 %

## 2024-05-01 DIAGNOSIS — L02.91 CUTANEOUS ABSCESS, UNSPECIFIED SITE: Primary | ICD-10-CM

## 2024-05-01 DIAGNOSIS — E89.0 POSTABLATIVE HYPOTHYROIDISM: ICD-10-CM

## 2024-05-01 LAB — TSH SERPL DL<=0.05 MIU/L-ACNC: 0.37 UIU/ML (ref 0.27–4.2)

## 2024-05-01 PROCEDURE — 99213 OFFICE O/P EST LOW 20 MIN: CPT | Performed by: NURSE PRACTITIONER

## 2024-05-01 PROCEDURE — 36415 COLL VENOUS BLD VENIPUNCTURE: CPT

## 2024-05-01 NOTE — PROGRESS NOTES
"Chief Complaint  Cyst (On vagina )    Subjective          Brigida Patel presents to [facility]  History of Present Illness    Is here today with c/o skin abscess in the groin  She has used warm compresses and has gotten it to drain  But is continues to be very painful    Review of Systems   Constitutional: Negative.  Negative for activity change, appetite change and fever.   Respiratory: Negative.  Negative for shortness of breath.    Cardiovascular: Negative.  Negative for chest pain.   Genitourinary: Negative.      Objective   Vital Signs:  /74   Pulse 71   Resp 18   Ht 157.5 cm (62.01\")   Wt 71.2 kg (157 lb)   SpO2 97%   BMI 28.71 kg/m²     BP Readings from Last 3 Encounters:   05/01/24 124/74   01/10/24 118/78   04/13/23 118/74        Wt Readings from Last 3 Encounters:   05/01/24 71.2 kg (157 lb)   01/10/24 69.9 kg (154 lb)   04/13/23 74.4 kg (164 lb)     Physical Exam  Cardiovascular:      Rate and Rhythm: Normal rate and regular rhythm.   Pulmonary:      Effort: Pulmonary effort is normal.      Breath sounds: Normal breath sounds.   Genitourinary:         Comments: Small, 4-5mm pink lesion which appears to be healing, with palpable cystic lesion beneath  Musculoskeletal:      Right lower leg: No edema.      Left lower leg: No edema.   Skin:     General: Skin is warm.   Neurological:      Mental Status: She is oriented to person, place, and time.        Result Review :                 Assessment and Plan    Diagnoses and all orders for this visit:    1. Cutaneous abscess, unspecified site (Primary)  -     mupirocin (BACTROBAN) 2 % ointment; Apply 1 Application topically to the appropriate area as directed 3 (Three) Times a Day for 7 days.  Dispense: 30 g; Refill: 0    Encouraged to continue with warm compresses, use bactroban 2-3 time daily for 5-7 days       Follow Up   No follow-ups on file.  Patient was given instructions and counseling regarding her condition " or for health maintenance advice. Please see specific information pulled into the AVS if appropriate.

## 2024-09-09 ENCOUNTER — TELEMEDICINE (OUTPATIENT)
Dept: FAMILY MEDICINE CLINIC | Facility: TELEHEALTH | Age: 34
End: 2024-09-09
Payer: COMMERCIAL

## 2024-09-09 DIAGNOSIS — L03.115 CELLULITIS OF RIGHT THIGH: Primary | ICD-10-CM

## 2024-09-09 RX ORDER — MUPIROCIN 20 MG/G
1 OINTMENT TOPICAL 3 TIMES DAILY
Qty: 15 G | Refills: 0 | Status: SHIPPED | OUTPATIENT
Start: 2024-09-09 | End: 2024-09-14

## 2024-09-09 RX ORDER — CEPHALEXIN 500 MG/1
500 CAPSULE ORAL 3 TIMES DAILY
Qty: 30 CAPSULE | Refills: 0 | OUTPATIENT
Start: 2024-09-09 | End: 2024-09-12

## 2024-09-09 RX ORDER — LEVOTHYROXINE SODIUM 125 UG/1
TABLET ORAL
COMMUNITY
Start: 2024-06-27

## 2024-09-09 NOTE — PROGRESS NOTES
No chief complaint on file.      Video Visit Reason:   Free Text Description: I have a bite on my leg and now has a rash  Subjective   Brigida Patel is a 34 y.o. female.     History of Present Illness  Insect bite to the right upper outer thigh a few days ago that has gotten red and spreading. She put hydrocortisone cream and spray benadryl on it but it has gotten more red and now the surface is bubbled up and more tender to the touch. She has been in [place] and was in chlorinated water.       The following portions of the patient's history were reviewed and updated as appropriate: allergies, current medications, past medical history, and problem list.      Past Medical History:   Diagnosis Date    Acute sinusitis 4/12/2021    Increase fluids. Tylenol/motrin for pain or fever. Medication and medication adverse effects discussed.  Follow-up 5-7 days for reevaluation if not improved or sooner if needed.    Anxiety 2/5/2020    Arthritis     Encounter for general adult medical examination without abnormal findings 2/25/2016    Hypothyroidism     Physical exam 6/30/2021    Sinusitis 7/23/2012    Sore throat 4/12/2021    Tobacco use 2/5/2020    Upper respiratory infection 4/12/2021    Acute upper respiratory infection.  Suspect viral etiology. Symptoms duration discussed.  Rx for zithromax if symptoms do not improve on their own within the next 4-5 days. Follow-up as needed.     Social History     Socioeconomic History    Marital status:    Tobacco Use    Smoking status: Former     Current packs/day: 1.00     Average packs/day: 1 pack/day for 10.0 years (10.0 ttl pk-yrs)     Types: Cigarettes    Smokeless tobacco: Never   Vaping Use    Vaping status: Never Used   Substance and Sexual Activity    Alcohol use: No    Drug use: No    Sexual activity: Defer     medication documentation: reviewed and updated with patient and   Current Outpatient Medications:     albuterol sulfate  (90 Base) MCG/ACT inhaler,  Inhale 2 puffs Every 4 (Four) Hours As Needed for Wheezing., Disp: 18 g, Rfl: 2    ammonium lactate (AmLactin) 12 % lotion, Apply  topically to the appropriate area as directed 2 (Two) Times a Day As Needed for Dry Skin., Disp: 225 g, Rfl: 1    diclofenac (VOLTAREN) 50 MG EC tablet, TAKE 1 TABLET BY MOUTH TWICE A DAY, Disp: 60 tablet, Rfl: 1    dicyclomine (Bentyl) 10 MG capsule, Take 1 capsule by mouth 3 (Three) Times a Day As Needed (Diarrhea)., Disp: 90 capsule, Rfl: 2    levothyroxine (SYNTHROID, LEVOTHROID) 125 MCG tablet, , Disp: , Rfl:     liothyronine (CYTOMEL) 5 MCG tablet, TAKE ONE TABLET BY MOUTH DAILY, Disp: 90 tablet, Rfl: 2    triamcinolone (KENALOG) 0.1 % ointment, Apply 1 application topically to the appropriate area as directed 2 (Two) Times a Day. As needed for itching, Disp: 30 g, Rfl: 1    venlafaxine XR (EFFEXOR-XR) 75 MG 24 hr capsule, TAKE 1 CAPSULE BY MOUTH DAILY, Disp: 90 capsule, Rfl: 1    cephalexin (KEFLEX) 500 MG capsule, Take 1 capsule by mouth 3 (Three) Times a Day for 10 days., Disp: 30 capsule, Rfl: 0    mupirocin (BACTROBAN) 2 % ointment, Apply 1 Application topically to the appropriate area as directed 3 (Three) Times a Day for 5 days., Disp: 15 g, Rfl: 0  Review of Systems   Constitutional:  Negative for activity change, chills and fever.   Skin:  Positive for rash and wound.       Objective   Physical Exam  Constitutional:       General: She is not in acute distress.     Appearance: She is not ill-appearing.   Pulmonary:      Effort: Pulmonary effort is normal.   Skin:     Findings: Lesion (erythematous lesion that appears to be an insect bite on the lateral mid thigh (right), erythematous, central brown with tiny blisters approx golf ball sized area with surround erythema) present.   Neurological:      Mental Status: She is alert.         Assessment & Plan   Diagnoses and all orders for this visit:    1. Cellulitis of right thigh (Primary)  -     cephalexin (KEFLEX) 500 MG  capsule; Take 1 capsule by mouth 3 (Three) Times a Day for 10 days.  Dispense: 30 capsule; Refill: 0  -     mupirocin (BACTROBAN) 2 % ointment; Apply 1 Application topically to the appropriate area as directed 3 (Three) Times a Day for 5 days.  Dispense: 15 g; Refill: 0                    Follow Up:  If your symptoms are not resolving by the completion of your treatment or are worsening, see your primary care provider for follow up. If you don't have a primary care provider, you may go to any Urgent Care for re-evaluation. If you develop any life threatening symptoms, go to the nearest Emergency Department immediately or call EMS.               The use of  Video Visit was utilized during this visit, using both [employer] and [employer]. The use of a video visit has been reviewed with the patient and verbal informed consent has been obtained. No technical difficulties occurred during the visit.    is located at [address] [place] [address] [place] [address]  Provider is located at [place], [place]

## 2024-09-09 NOTE — PATIENT INSTRUCTIONS
Cellulitis, Adult    Cellulitis is a skin infection. The infected area is often warm, red, swollen, and sore. It occurs most often on the legs, feet, and toes, but can happen on any part of the body.  This condition can be life-threatening without treatment. It is very important to get treated right away.  What are the causes?  This condition is caused by bacteria. The bacteria enter through a break in the skin, such as:  A cut.  A burn.  A bug bite.  An animal bite.  An open sore.  A crack.  What increases the risk?  Having a weak body's defense system (immune system).  Being older than 60 years old.  Having a blood sugar problem (diabetes).  Having a long-term liver disease (cirrhosis) or kidney disease.  Being very overweight (obese).  Having a skin problem, such as:  An itchy rash.  A rash caused by a fungus.  A rash with blisters.  Slow movement of blood in the veins (venous stasis).  Fluid buildup below the skin (edema).  This condition is more likely to occur in people who:  Have open cuts, burns, bites, or scrapes on the skin.  Have been treated with high-energy rays (radiation).  Use IV drugs.  What are the signs or symptoms?  Skin that:  Looks red or purple, or slightly darker than your usual skin color.  Has streaks.  Has spots.  Is swollen.  Is sore or painful when you touch it.  Is warm.  A fever.  Chills.  Blisters.  Tiredness (fatigue).  How is this treated?  Medicines to treat infections or allergies.  Rest.  Placing cold or warm cloths on the skin.  Staying in the hospital, if the condition is very bad. You may need medicines through an IV.  Follow these instructions at home:  Medicines  Take over-the-counter and prescription medicines only as told by your doctor.  If you were prescribed antibiotics, take them as told by your doctor. Do not stop using them even if you start to feel better.  General instructions  Drink enough fluid to keep your pee (urine) pale yellow.  Do not touch or rub the  infected area.  Raise (elevate) the infected area above the level of your heart while you are sitting or lying down.  Return to your normal activities when your doctor says that it is safe.  Place cold or warm cloths on the area as told by your doctor.  Keep all follow-up visits. Your doctor will need to make sure that a more serious infection is not developing.  Contact a doctor if:  You have a fever.  You do not start to get better after 1-2 days of treatment.  Your bone or joint under the infected area starts to hurt after the skin has healed.  Your infection comes back in the same area or another area. Signs of this may include:  You have a swollen bump in the area.  Your red area gets larger, turns dark in color, or hurts more.  You have more fluid coming from the wound.  Pus or a bad smell develops in your infected area.  You have more pain.  You feel sick and have muscle aches and weakness.  You develop vomiting or watery poop that will not go away.  Get help right away if:  You see red streaks coming from the area.  You notice the skin turns purple or black and falls off.  These symptoms may be an emergency. Get help right away. Call 911.  Do not wait to see if the symptoms will go away.  Do not drive yourself to the hospital.  This information is not intended to replace advice given to you by your health care provider. Make sure you discuss any questions you have with your health care provider.  Document Revised: 08/15/2023 Document Reviewed: 08/15/2023  [employer] Patient Education © 2024 [employer]

## 2024-09-17 ENCOUNTER — HOSPITAL ENCOUNTER (EMERGENCY)
Facility: HOSPITAL | Age: 34
Discharge: HOME | End: 2024-09-17
Attending: EMERGENCY MEDICINE | Admitting: EMERGENCY MEDICINE
Payer: COMMERCIAL

## 2024-09-17 ENCOUNTER — APPOINTMENT (OUTPATIENT)
Dept: CT IMAGING | Facility: HOSPITAL | Age: 34
End: 2024-09-17
Payer: COMMERCIAL

## 2024-09-17 VITALS
WEIGHT: 152.34 LBS | RESPIRATION RATE: 18 BRPM | OXYGEN SATURATION: 99 % | TEMPERATURE: 98 F | DIASTOLIC BLOOD PRESSURE: 75 MMHG | HEIGHT: 61 IN | BODY MASS INDEX: 28.76 KG/M2 | SYSTOLIC BLOOD PRESSURE: 109 MMHG | HEART RATE: 77 BPM

## 2024-09-17 DIAGNOSIS — R10.9 ABDOMINAL PAIN, UNSPECIFIED ABDOMINAL LOCATION: Primary | ICD-10-CM

## 2024-09-17 DIAGNOSIS — R21 RASH: ICD-10-CM

## 2024-09-17 DIAGNOSIS — R19.7 DIARRHEA, UNSPECIFIED TYPE: ICD-10-CM

## 2024-09-17 LAB
ALBUMIN SERPL-MCNC: 4.4 G/DL (ref 3.5–5.2)
ALBUMIN/GLOB SERPL: 1.7 G/DL
ALP SERPL-CCNC: 71 U/L (ref 39–117)
ALT SERPL W P-5'-P-CCNC: 7 U/L (ref 1–33)
ANION GAP SERPL CALCULATED.3IONS-SCNC: 9.4 MMOL/L (ref 5–15)
AST SERPL-CCNC: 11 U/L (ref 1–32)
B-HCG UR QL: NEGATIVE
BASOPHILS # BLD AUTO: 0.06 10*3/MM3 (ref 0–0.2)
BASOPHILS NFR BLD AUTO: 0.6 % (ref 0–1.5)
BILIRUB SERPL-MCNC: 0.3 MG/DL (ref 0–1.2)
BILIRUB UR QL STRIP: NEGATIVE
BUN SERPL-MCNC: 7 MG/DL (ref 6–20)
BUN/CREAT SERPL: 10 (ref 7–25)
CALCIUM SPEC-SCNC: 9.2 MG/DL (ref 8.6–10.5)
CHLORIDE SERPL-SCNC: 102 MMOL/L (ref 98–107)
CLARITY UR: CLEAR
CO2 SERPL-SCNC: 28.6 MMOL/L (ref 22–29)
COLOR UR: YELLOW
CREAT SERPL-MCNC: 0.7 MG/DL (ref 0.57–1)
DEPRECATED RDW RBC AUTO: 44.9 FL (ref 37–54)
EGFRCR SERPLBLD CKD-EPI 2021: 115.1 ML/MIN/1.73
EOSINOPHIL # BLD AUTO: 0 10*3/MM3 (ref 0–0.4)
EOSINOPHIL NFR BLD AUTO: 0 % (ref 0.3–6.2)
ERYTHROCYTE [DISTWIDTH] IN BLOOD BY AUTOMATED COUNT: 13.2 % (ref 12.3–15.4)
GLOBULIN UR ELPH-MCNC: 2.6 GM/DL
GLUCOSE SERPL-MCNC: 86 MG/DL (ref 65–99)
GLUCOSE UR STRIP-MCNC: NEGATIVE MG/DL
HCT VFR BLD AUTO: 45.4 % (ref 34–46.6)
HGB BLD-MCNC: 14.5 G/DL (ref 12–15.9)
HGB UR QL STRIP.AUTO: NEGATIVE
HOLD SPECIMEN: NORMAL
IMM GRANULOCYTES # BLD AUTO: 0.04 10*3/MM3 (ref 0–0.05)
IMM GRANULOCYTES NFR BLD AUTO: 0.4 % (ref 0–0.5)
KETONES UR QL STRIP: NEGATIVE
LEUKOCYTE ESTERASE UR QL STRIP.AUTO: NEGATIVE
LIPASE SERPL-CCNC: 22 U/L (ref 13–60)
LYMPHOCYTES # BLD AUTO: 2.65 10*3/MM3 (ref 0.7–3.1)
LYMPHOCYTES NFR BLD AUTO: 25.2 % (ref 19.6–45.3)
MCH RBC QN AUTO: 29.4 PG (ref 26.6–33)
MCHC RBC AUTO-ENTMCNC: 31.9 G/DL (ref 31.5–35.7)
MCV RBC AUTO: 91.9 FL (ref 79–97)
MONOCYTES # BLD AUTO: 0.54 10*3/MM3 (ref 0.1–0.9)
MONOCYTES NFR BLD AUTO: 5.1 % (ref 5–12)
NEUTROPHILS NFR BLD AUTO: 68.7 % (ref 42.7–76)
NEUTROPHILS NFR BLD AUTO: 7.21 10*3/MM3 (ref 1.7–7)
NITRITE UR QL STRIP: NEGATIVE
NRBC BLD AUTO-RTO: 0 /100 WBC (ref 0–0.2)
PH UR STRIP.AUTO: 7.5 [PH] (ref 5–8)
PLATELET # BLD AUTO: 283 10*3/MM3 (ref 140–450)
PMV BLD AUTO: 10.2 FL (ref 6–12)
POTASSIUM SERPL-SCNC: 3.9 MMOL/L (ref 3.5–5.2)
PROT SERPL-MCNC: 7 G/DL (ref 6–8.5)
PROT UR QL STRIP: NEGATIVE
RBC # BLD AUTO: 4.94 10*6/MM3 (ref 3.77–5.28)
SODIUM SERPL-SCNC: 140 MMOL/L (ref 136–145)
SP GR UR STRIP: 1.01 (ref 1–1.03)
UROBILINOGEN UR QL STRIP: NORMAL
WBC NRBC COR # BLD AUTO: 10.5 10*3/MM3 (ref 3.4–10.8)
WHOLE BLOOD HOLD COAG: NORMAL

## 2024-09-17 PROCEDURE — 25010000002 ONDANSETRON PER 1 MG: Performed by: EMERGENCY MEDICINE

## 2024-09-17 PROCEDURE — 99285 EMERGENCY DEPT VISIT HI MDM: CPT

## 2024-09-17 PROCEDURE — 83690 ASSAY OF LIPASE: CPT | Performed by: EMERGENCY MEDICINE

## 2024-09-17 PROCEDURE — 25010000002 KETOROLAC TROMETHAMINE PER 15 MG: Performed by: EMERGENCY MEDICINE

## 2024-09-17 PROCEDURE — 25510000001 IOPAMIDOL PER 1 ML: Performed by: EMERGENCY MEDICINE

## 2024-09-17 PROCEDURE — 81025 URINE PREGNANCY TEST: CPT | Performed by: EMERGENCY MEDICINE

## 2024-09-17 PROCEDURE — 85025 COMPLETE CBC W/AUTO DIFF WBC: CPT | Performed by: EMERGENCY MEDICINE

## 2024-09-17 PROCEDURE — 74177 CT ABD & PELVIS W/CONTRAST: CPT

## 2024-09-17 PROCEDURE — 96375 TX/PRO/DX INJ NEW DRUG ADDON: CPT

## 2024-09-17 PROCEDURE — 25810000003 SODIUM CHLORIDE 0.9 % SOLUTION: Performed by: EMERGENCY MEDICINE

## 2024-09-17 PROCEDURE — 81003 URINALYSIS AUTO W/O SCOPE: CPT | Performed by: EMERGENCY MEDICINE

## 2024-09-17 PROCEDURE — 96374 THER/PROPH/DIAG INJ IV PUSH: CPT

## 2024-09-17 PROCEDURE — 80053 COMPREHEN METABOLIC PANEL: CPT | Performed by: EMERGENCY MEDICINE

## 2024-09-17 RX ORDER — DICYCLOMINE HCL 20 MG
20 TABLET ORAL EVERY 6 HOURS PRN
Qty: 30 TABLET | Refills: 0 | Status: SHIPPED | OUTPATIENT
Start: 2024-09-17

## 2024-09-17 RX ORDER — PANTOPRAZOLE SODIUM 40 MG/1
40 TABLET, DELAYED RELEASE ORAL DAILY
Qty: 30 TABLET | Refills: 0 | Status: SHIPPED | OUTPATIENT
Start: 2024-09-17

## 2024-09-17 RX ORDER — ONDANSETRON 4 MG/1
4 TABLET, ORALLY DISINTEGRATING ORAL EVERY 6 HOURS PRN
Qty: 20 TABLET | Refills: 0 | Status: SHIPPED | OUTPATIENT
Start: 2024-09-17

## 2024-09-17 RX ORDER — PANTOPRAZOLE SODIUM 40 MG/10ML
40 INJECTION, POWDER, LYOPHILIZED, FOR SOLUTION INTRAVENOUS ONCE
Status: COMPLETED | OUTPATIENT
Start: 2024-09-17 | End: 2024-09-17

## 2024-09-17 RX ORDER — BETAMETHASONE DIPROPIONATE 0.5 MG/G
1 CREAM TOPICAL 2 TIMES DAILY
Qty: 45 G | Refills: 0 | Status: SHIPPED | OUTPATIENT
Start: 2024-09-17

## 2024-09-17 RX ORDER — KETOROLAC TROMETHAMINE 30 MG/ML
15 INJECTION, SOLUTION INTRAMUSCULAR; INTRAVENOUS ONCE
Status: COMPLETED | OUTPATIENT
Start: 2024-09-17 | End: 2024-09-17

## 2024-09-17 RX ORDER — IOPAMIDOL 755 MG/ML
100 INJECTION, SOLUTION INTRAVASCULAR
Status: COMPLETED | OUTPATIENT
Start: 2024-09-17 | End: 2024-09-17

## 2024-09-17 RX ORDER — ONDANSETRON 2 MG/ML
4 INJECTION INTRAMUSCULAR; INTRAVENOUS ONCE
Status: COMPLETED | OUTPATIENT
Start: 2024-09-17 | End: 2024-09-17

## 2024-09-17 RX ORDER — SODIUM CHLORIDE 0.9 % (FLUSH) 0.9 %
10 SYRINGE (ML) INJECTION AS NEEDED
Status: DISCONTINUED | OUTPATIENT
Start: 2024-09-17 | End: 2024-09-18 | Stop reason: HOSPADM

## 2024-09-17 RX ADMIN — SODIUM CHLORIDE 1000 ML: 0.9 INJECTION, SOLUTION INTRAVENOUS at 21:00

## 2024-09-17 RX ADMIN — IOPAMIDOL 100 ML: 755 INJECTION, SOLUTION INTRAVENOUS at 21:24

## 2024-09-17 RX ADMIN — SODIUM CHLORIDE 1000 ML: 0.9 INJECTION, SOLUTION INTRAVENOUS at 21:42

## 2024-09-17 RX ADMIN — PANTOPRAZOLE SODIUM 40 MG: 40 INJECTION, POWDER, FOR SOLUTION INTRAVENOUS at 20:23

## 2024-09-17 RX ADMIN — ONDANSETRON 4 MG: 2 INJECTION INTRAMUSCULAR; INTRAVENOUS at 20:23

## 2024-09-17 RX ADMIN — KETOROLAC TROMETHAMINE 15 MG: 30 INJECTION, SOLUTION INTRAMUSCULAR at 20:22

## 2024-09-18 ENCOUNTER — TELEPHONE (OUTPATIENT)
Dept: FAMILY MEDICINE CLINIC | Facility: CLINIC | Age: 34
End: 2024-09-18
Payer: COMMERCIAL

## 2024-10-01 DIAGNOSIS — F33.1 MODERATE EPISODE OF RECURRENT MAJOR DEPRESSIVE DISORDER: ICD-10-CM

## 2024-10-01 DIAGNOSIS — F41.9 ANXIETY: ICD-10-CM

## 2024-10-01 RX ORDER — VENLAFAXINE HYDROCHLORIDE 75 MG/1
75 CAPSULE, EXTENDED RELEASE ORAL DAILY
Qty: 90 CAPSULE | Refills: 1 | Status: SHIPPED | OUTPATIENT
Start: 2024-10-01

## 2024-10-02 ENCOUNTER — OFFICE VISIT (OUTPATIENT)
Dept: FAMILY MEDICINE CLINIC | Facility: CLINIC | Age: 34
End: 2024-10-02
Payer: COMMERCIAL

## 2024-10-02 VITALS
WEIGHT: 153.6 LBS | BODY MASS INDEX: 29 KG/M2 | DIASTOLIC BLOOD PRESSURE: 63 MMHG | HEIGHT: 61 IN | HEART RATE: 91 BPM | SYSTOLIC BLOOD PRESSURE: 104 MMHG | OXYGEN SATURATION: 97 % | RESPIRATION RATE: 18 BRPM

## 2024-10-02 DIAGNOSIS — R11.10 VOMITING AND DIARRHEA: Primary | ICD-10-CM

## 2024-10-02 DIAGNOSIS — R19.7 VOMITING AND DIARRHEA: Primary | ICD-10-CM

## 2024-10-02 PROCEDURE — 99213 OFFICE O/P EST LOW 20 MIN: CPT | Performed by: STUDENT IN AN ORGANIZED HEALTH CARE EDUCATION/TRAINING PROGRAM

## 2024-10-02 RX ORDER — FAMOTIDINE 20 MG/1
20 TABLET, FILM COATED ORAL 2 TIMES DAILY
Qty: 30 TABLET | Refills: 0 | Status: SHIPPED | OUTPATIENT
Start: 2024-10-02

## 2024-10-02 RX ORDER — DICYCLOMINE HCL 20 MG
20 TABLET ORAL EVERY 6 HOURS
Qty: 30 TABLET | Refills: 0 | Status: SHIPPED | OUTPATIENT
Start: 2024-10-02

## 2024-10-02 NOTE — PROGRESS NOTES
"Chief Complaint  Chief Complaint   Patient presents with    Vomiting    Diarrhea     Subjective        Brigida Patel is a 34 y.o. female who presents to [facility].    History of Present Illness  Here for acute visit.   Symptoms started about 3 days ago.  Vomiting, diarrhea, generalized abd pain.  She is able to keep fluids down but feels they come right out in diarrhea.    She is having fever, Tmax of 102F.    Daughter had diarrhea on Saturday.    No blood in diarrhea.    She has been taking zofran at home which seems to help some.    She also tried some anti diarrhea medication which did not help.    She has taken tylenol, trying to avoid ibuprofen b/c of stomach discomfort.      Objective   /63   Pulse 91   Resp 18   Ht 154.9 cm (61\")   Wt 69.7 kg (153 lb 9.6 oz)   SpO2 97%   BMI 29.02 kg/m²     Estimated body mass index is 29.02 kg/m² as calculated from the following:    Height as of this encounter: 154.9 cm (61\").    Weight as of this encounter: 69.7 kg (153 lb 9.6 oz).     Physical Exam   GEN: In no acute distress, non toxic appearing  HEENT: Pupils equal and reactive to light, sclera clear.   CV: 2+ peripheral pulses, No extremity edema.   ABD: Soft, generalized mild ttp, nondistended, normal bowel sounds.     Result Review :              Assessment and Plan     Diagnoses and all orders for this visit:    1. Vomiting and diarrhea (Primary)  -     dicyclomine (BENTYL) 20 MG tablet; Take 1 tablet by mouth Every 6 (Six) Hours.  Dispense: 30 tablet; Refill: 0  -     famotidine (Pepcid) 20 MG tablet; Take 1 tablet by mouth 2 (Two) Times a Day.  Dispense: 30 tablet; Refill: 0    Likely viral GE.  No red flag signs / symptoms to warrant emergent imaging.  BP appropriate.  Encourage increased fluids as tolerated.  Continue zofran prn.  Start bentyl 20 mg bid until symptoms improved.  Start famotidine 20 mg bid until symptoms improved.  [employer] foods as tolerated.   Update us " if any changes.         Follow Up   If no improvement

## 2024-10-02 NOTE — Clinical Note
October 2, 2024     Patient: Brigida Patel   YOB: 1990   Date of Visit: 10/2/2024       To Whom It May Concern:    It is my medical opinion that Brigida Patel may return to work in two days.         Sincerely,        Ishan Issa,     CC: No Recipients

## 2024-10-04 NOTE — LETTER
October 2, 2024     Patient: Brigida Patel   YOB: 1990   Date of Visit: 10/2/2024       To Whom It May Concern:      Please excuse Brigida Patel from work on 9/30, 10/1, and 10/2 for illness.  She was seen in my office on 10/2.  If you have further questions or need additional information feel free to contact our office.         Sincerely,        Ishan Issa, DO     4 = No assist / stand by assistance

## 2024-10-25 DIAGNOSIS — M19.90 ARTHRITIS: ICD-10-CM

## 2024-11-19 ENCOUNTER — OFFICE VISIT (OUTPATIENT)
Dept: FAMILY MEDICINE CLINIC | Facility: CLINIC | Age: 34
End: 2024-11-19
Payer: COMMERCIAL

## 2024-11-19 VITALS
HEART RATE: 72 BPM | DIASTOLIC BLOOD PRESSURE: 72 MMHG | HEIGHT: 61 IN | SYSTOLIC BLOOD PRESSURE: 122 MMHG | WEIGHT: 155 LBS | RESPIRATION RATE: 18 BRPM | OXYGEN SATURATION: 97 % | BODY MASS INDEX: 29.27 KG/M2

## 2024-11-19 DIAGNOSIS — G56.01 RIGHT CARPAL TUNNEL SYNDROME: Primary | ICD-10-CM

## 2024-11-19 PROCEDURE — 99213 OFFICE O/P EST LOW 20 MIN: CPT | Performed by: NURSE PRACTITIONER

## 2024-11-19 RX ORDER — PREDNISONE 20 MG/1
TABLET ORAL
Qty: 18 TABLET | Refills: 0 | Status: SHIPPED | OUTPATIENT
Start: 2024-11-19

## 2024-11-19 NOTE — PROGRESS NOTES
"Chief Complaint  Edema (Right hand swelling )  Subjective        Brigida Patel presents to [facility]  History of Present Illness  Pt comes in today with c/o inflammation to left hand/wrist. 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  Has numbness/tingling.   Wearing a brace.   It helps some.  Takes IBU prn and diclofenac regularly.   Was unable to work today.        Objective     Vital Signs:   /72   Pulse 72   Resp 18   Ht 154.9 cm (60.98\")   Wt 70.3 kg (155 lb)   SpO2 97%   BMI 29.30 kg/m²       BP Readings from Last 3 Encounters:   11/19/24 122/72   10/02/24 104/63   09/17/24 109/75       Wt Readings from Last 3 Encounters:   11/19/24 70.3 kg (155 lb)   10/02/24 69.7 kg (153 lb 9.6 oz)   09/17/24 69.1 kg (152 lb 5.4 oz)     Physical Exam  Constitutional:       Appearance: She is well-developed.   Eyes:      Pupils: Pupils are equal, round, and reactive to light.   Cardiovascular:      Rate and Rhythm: Normal rate and regular rhythm.   Pulmonary:      Effort: Pulmonary effort is normal.      Breath sounds: Normal breath sounds.   Neurological:      Mental Status: She is alert and oriented to person, place, and time.        Result Review :                 Assessment and Plan    Diagnoses and all orders for this visit:    1. Right carpal tunnel syndrome (Primary)  -     Ambulatory Referral to Hand Surgery  -     predniSONE (DELTASONE) 20 MG tablet; 3 po x 3 days, 2 po x 3 days, 1 po x 3 days  Dispense: 18 tablet; Refill: 0    Prednisone taper  Referral to hand surgery  During this office visit, we discussed the pertinent aspects of the visit and treatment recommendations. Pt verbalizes understanding. Follow up was discussed. Patient was given the opportunity to ask questions and discuss other concerns.         Follow Up   No follow-ups on file.  Patient was given instructions and counseling regarding her condition or for health maintenance advice. Please see " specific information pulled into the AVS if appropriate.

## 2024-11-20 ENCOUNTER — PATIENT MESSAGE (OUTPATIENT)
Dept: FAMILY MEDICINE CLINIC | Facility: CLINIC | Age: 34
End: 2024-11-20
Payer: COMMERCIAL

## 2025-01-24 ENCOUNTER — LAB (OUTPATIENT)
Dept: FAMILY MEDICINE CLINIC | Facility: CLINIC | Age: 35
End: 2025-01-24
Payer: COMMERCIAL

## 2025-01-24 ENCOUNTER — OFFICE VISIT (OUTPATIENT)
Dept: FAMILY MEDICINE CLINIC | Facility: CLINIC | Age: 35
End: 2025-01-24
Payer: COMMERCIAL

## 2025-01-24 VITALS
SYSTOLIC BLOOD PRESSURE: 112 MMHG | OXYGEN SATURATION: 96 % | DIASTOLIC BLOOD PRESSURE: 64 MMHG | BODY MASS INDEX: 29.19 KG/M2 | WEIGHT: 154.4 LBS | RESPIRATION RATE: 16 BRPM | HEART RATE: 96 BPM

## 2025-01-24 DIAGNOSIS — Z00.00 ANNUAL PHYSICAL EXAM: Primary | ICD-10-CM

## 2025-01-24 DIAGNOSIS — E55.9 VITAMIN D DEFICIENCY: ICD-10-CM

## 2025-01-24 DIAGNOSIS — F33.1 MODERATE EPISODE OF RECURRENT MAJOR DEPRESSIVE DISORDER: ICD-10-CM

## 2025-01-24 DIAGNOSIS — Z12.4 CERVICAL CANCER SCREENING: ICD-10-CM

## 2025-01-24 DIAGNOSIS — E89.0 POSTABLATIVE HYPOTHYROIDISM: ICD-10-CM

## 2025-01-24 DIAGNOSIS — E53.8 VITAMIN B12 DEFICIENCY: ICD-10-CM

## 2025-01-24 LAB
BASOPHILS # BLD AUTO: 0.08 10*3/MM3 (ref 0–0.2)
BASOPHILS NFR BLD AUTO: 0.7 % (ref 0–1.5)
DEPRECATED RDW RBC AUTO: 41.6 FL (ref 37–54)
EOSINOPHIL # BLD AUTO: 0 10*3/MM3 (ref 0–0.4)
EOSINOPHIL NFR BLD AUTO: 0 % (ref 0.3–6.2)
ERYTHROCYTE [DISTWIDTH] IN BLOOD BY AUTOMATED COUNT: 12.7 % (ref 12.3–15.4)
HBA1C MFR BLD: 5.3 % (ref 4.8–5.6)
HCT VFR BLD AUTO: 45.4 % (ref 34–46.6)
HGB BLD-MCNC: 14.6 G/DL (ref 12–15.9)
IMM GRANULOCYTES # BLD AUTO: 0.04 10*3/MM3 (ref 0–0.05)
IMM GRANULOCYTES NFR BLD AUTO: 0.4 % (ref 0–0.5)
LYMPHOCYTES # BLD AUTO: 2.2 10*3/MM3 (ref 0.7–3.1)
LYMPHOCYTES NFR BLD AUTO: 19.4 % (ref 19.6–45.3)
MCH RBC QN AUTO: 28.9 PG (ref 26.6–33)
MCHC RBC AUTO-ENTMCNC: 32.2 G/DL (ref 31.5–35.7)
MCV RBC AUTO: 89.9 FL (ref 79–97)
MONOCYTES # BLD AUTO: 0.54 10*3/MM3 (ref 0.1–0.9)
MONOCYTES NFR BLD AUTO: 4.8 % (ref 5–12)
NEUTROPHILS NFR BLD AUTO: 74.7 % (ref 42.7–76)
NEUTROPHILS NFR BLD AUTO: 8.49 10*3/MM3 (ref 1.7–7)
NRBC BLD AUTO-RTO: 0 /100 WBC (ref 0–0.2)
PLATELET # BLD AUTO: 265 10*3/MM3 (ref 140–450)
PMV BLD AUTO: 11.3 FL (ref 6–12)
RBC # BLD AUTO: 5.05 10*6/MM3 (ref 3.77–5.28)
WBC NRBC COR # BLD AUTO: 11.35 10*3/MM3 (ref 3.4–10.8)

## 2025-01-24 PROCEDURE — 82607 VITAMIN B-12: CPT | Performed by: STUDENT IN AN ORGANIZED HEALTH CARE EDUCATION/TRAINING PROGRAM

## 2025-01-24 PROCEDURE — 82306 VITAMIN D 25 HYDROXY: CPT | Performed by: STUDENT IN AN ORGANIZED HEALTH CARE EDUCATION/TRAINING PROGRAM

## 2025-01-24 PROCEDURE — 80061 LIPID PANEL: CPT | Performed by: STUDENT IN AN ORGANIZED HEALTH CARE EDUCATION/TRAINING PROGRAM

## 2025-01-24 PROCEDURE — 80050 GENERAL HEALTH PANEL: CPT | Performed by: STUDENT IN AN ORGANIZED HEALTH CARE EDUCATION/TRAINING PROGRAM

## 2025-01-24 PROCEDURE — 84439 ASSAY OF FREE THYROXINE: CPT | Performed by: STUDENT IN AN ORGANIZED HEALTH CARE EDUCATION/TRAINING PROGRAM

## 2025-01-24 PROCEDURE — 99395 PREV VISIT EST AGE 18-39: CPT | Performed by: STUDENT IN AN ORGANIZED HEALTH CARE EDUCATION/TRAINING PROGRAM

## 2025-01-24 PROCEDURE — 83036 HEMOGLOBIN GLYCOSYLATED A1C: CPT | Performed by: STUDENT IN AN ORGANIZED HEALTH CARE EDUCATION/TRAINING PROGRAM

## 2025-01-24 PROCEDURE — 36415 COLL VENOUS BLD VENIPUNCTURE: CPT | Performed by: STUDENT IN AN ORGANIZED HEALTH CARE EDUCATION/TRAINING PROGRAM

## 2025-01-24 RX ORDER — VENLAFAXINE HYDROCHLORIDE 150 MG/1
150 CAPSULE, EXTENDED RELEASE ORAL DAILY
Qty: 30 CAPSULE | Refills: 2 | Status: SHIPPED | OUTPATIENT
Start: 2025-01-24

## 2025-01-24 NOTE — PROGRESS NOTES
"Chief Complaint  Chief Complaint   Patient presents with    Annual Exam     Subjective        Brigida Patel is a 34 y.o. female who presents to [facility].    History of Present Illness  Here for annual physical.    Diet: plans to stop drinking soda.  Drinks a lot of water.    Exercise: beach body on demand, 3 days / wk.    Sleep: 6 hrs / night; has trouble falling asleep and staying asleep.    Smokin/2 ppd.  Quit for a couple years previously.  She plans to stop smoking this coming Monday.      Feels like she may need effexor increased.  Her dad passed away last August and she has been more depressed since.  She has been on effexor for a long time.    She feels the effexor makes her more stable.      She has a gynecologist for pap, needs to see.      Objective   /64   Pulse 96   Resp 16   Wt 70 kg (154 lb 6.4 oz)   SpO2 96%   BMI 29.19 kg/m²     Estimated body mass index is 29.19 kg/m² as calculated from the following:    Height as of 24: 154.9 cm (60.98\").    Weight as of this encounter: 70 kg (154 lb 6.4 oz).     Physical Exam   GEN: In no acute distress, non toxic appearing  HEENT: Pupils equal and reactive to light, sclera clear. Mucous membranes moist. Oropharynx without erythema or exudate. No cervical or submandibular lymphadenopathy.  Bilateral TM's wnl.    CV: Regular rate and rhythm, no murmurs, 2+ peripheral pulses, No extremity edema.   RESP: Lungs clear to auscultation anteriorly and posteriorly in all lung fields bilaterally.  NEURO: AAO to person, place, and time. CN 2-12 intact grossly.  PSYCH: Affect normal, insight fair     PHQ-2 Depression Screening  Little interest or pleasure in doing things? Not at all   Feeling down, depressed, or hopeless? Not at all   PHQ-2 Total Score 0      Result Review :              Assessment and Plan     Diagnoses and all orders for this visit:    1. Annual physical exam (Primary)  Overall reassuring exam.  Blood " pressure at goal today.  Check blood work as below.  Encouraged regular exercise.  Encouraged healthy diet with plenty of fruits, vegetables, protein, water.  Discussed smoking cessation.  Follow-up with gynecology for Pap.  Breast cancer screening to start at 40.  Colon cancer screening start at 45.  Next physical in 1 year, follow-up 3 to 4 months for below.  -     Comprehensive Metabolic Panel  -     CBC Auto Differential  -     Hemoglobin A1c  -     Lipid Panel  -     T4, Free  -     TSH  -     Vitamin B12    2. Postablative hypothyroidism  Continue levothyroxine 125 mcg daily, liothyronine 5 mcg daily.  Check TSH and free T4 today.  -     T4, Free  -     TSH    3. Vitamin D deficiency  History of vitamin D deficiency, check vitamin D levels today.  -     Vitamin D,25-Hydroxy    4. Cervical cancer screening  Follow-up with gynecology.    5. Moderate episode of recurrent major depressive disorder  Trial increase of Effexor from 75 to 150 mg daily.  Keep me updated.  -     venlafaxine XR (Effexor XR) 150 MG 24 hr capsule; Take 1 capsule by mouth Daily.  Dispense: 30 capsule; Refill: 2    6. Vitamin B12 deficiency  History of vitamin B12 deficiency, check levels today.  -     Vitamin B12       Follow Up     Return in about 3 months (around 4/24/2025) for smoking, mood, weight f/u - 30 minutes please .   Breath sounds clear and equal bilaterally.

## 2025-01-25 LAB
25(OH)D3 SERPL-MCNC: 30.6 NG/ML (ref 30–100)
ALBUMIN SERPL-MCNC: 4.7 G/DL (ref 3.5–5.2)
ALBUMIN/GLOB SERPL: 1.6 G/DL
ALP SERPL-CCNC: 80 U/L (ref 39–117)
ALT SERPL W P-5'-P-CCNC: 12 U/L (ref 1–33)
ANION GAP SERPL CALCULATED.3IONS-SCNC: 11 MMOL/L (ref 5–15)
AST SERPL-CCNC: 13 U/L (ref 1–32)
BILIRUB SERPL-MCNC: 0.2 MG/DL (ref 0–1.2)
BUN SERPL-MCNC: 10 MG/DL (ref 6–20)
BUN/CREAT SERPL: 15.4 (ref 7–25)
CALCIUM SPEC-SCNC: 9.6 MG/DL (ref 8.6–10.5)
CHLORIDE SERPL-SCNC: 97 MMOL/L (ref 98–107)
CHOLEST SERPL-MCNC: 217 MG/DL (ref 0–200)
CO2 SERPL-SCNC: 30 MMOL/L (ref 22–29)
CREAT SERPL-MCNC: 0.65 MG/DL (ref 0.57–1)
EGFRCR SERPLBLD CKD-EPI 2021: 117.2 ML/MIN/1.73
GLOBULIN UR ELPH-MCNC: 3 GM/DL
GLUCOSE SERPL-MCNC: 106 MG/DL (ref 65–99)
HDLC SERPL-MCNC: 57 MG/DL (ref 40–60)
LDLC SERPL CALC-MCNC: 133 MG/DL (ref 0–100)
LDLC/HDLC SERPL: 2.27 {RATIO}
POTASSIUM SERPL-SCNC: 3.3 MMOL/L (ref 3.5–5.2)
PROT SERPL-MCNC: 7.7 G/DL (ref 6–8.5)
SODIUM SERPL-SCNC: 138 MMOL/L (ref 136–145)
T4 FREE SERPL-MCNC: 1.27 NG/DL (ref 0.92–1.68)
TRIGL SERPL-MCNC: 154 MG/DL (ref 0–150)
TSH SERPL DL<=0.05 MIU/L-ACNC: 8.51 UIU/ML (ref 0.27–4.2)
VIT B12 BLD-MCNC: 317 PG/ML (ref 211–946)
VLDLC SERPL-MCNC: 27 MG/DL (ref 5–40)

## 2025-01-26 DIAGNOSIS — E89.0 POSTABLATIVE HYPOTHYROIDISM: ICD-10-CM

## 2025-01-27 RX ORDER — LIOTHYRONINE SODIUM 5 UG/1
5 TABLET ORAL DAILY
Qty: 90 TABLET | Refills: 2 | Status: SHIPPED | OUTPATIENT
Start: 2025-01-27

## 2025-02-13 ENCOUNTER — PATIENT MESSAGE (OUTPATIENT)
Dept: FAMILY MEDICINE CLINIC | Facility: CLINIC | Age: 35
End: 2025-02-13
Payer: COMMERCIAL

## 2025-02-13 DIAGNOSIS — F33.1 MODERATE EPISODE OF RECURRENT MAJOR DEPRESSIVE DISORDER: Primary | ICD-10-CM

## 2025-02-17 RX ORDER — VENLAFAXINE HYDROCHLORIDE 75 MG/1
75 CAPSULE, EXTENDED RELEASE ORAL DAILY
Qty: 90 CAPSULE | Refills: 3 | Status: SHIPPED | OUTPATIENT
Start: 2025-02-17

## 2025-04-10 ENCOUNTER — ON CAMPUS - OUTPATIENT (AMBULATORY)
Dept: URBAN - METROPOLITAN AREA HOSPITAL 2 | Facility: HOSPITAL | Age: 35
End: 2025-04-10
Payer: COMMERCIAL

## 2025-04-10 VITALS
HEART RATE: 91 BPM | DIASTOLIC BLOOD PRESSURE: 88 MMHG | HEART RATE: 79 BPM | SYSTOLIC BLOOD PRESSURE: 97 MMHG | HEIGHT: 62 IN | RESPIRATION RATE: 18 BRPM | HEART RATE: 71 BPM | DIASTOLIC BLOOD PRESSURE: 69 MMHG | DIASTOLIC BLOOD PRESSURE: 87 MMHG | RESPIRATION RATE: 16 BRPM | DIASTOLIC BLOOD PRESSURE: 80 MMHG | OXYGEN SATURATION: 100 % | SYSTOLIC BLOOD PRESSURE: 101 MMHG | TEMPERATURE: 98.5 F | SYSTOLIC BLOOD PRESSURE: 103 MMHG | HEART RATE: 92 BPM | SYSTOLIC BLOOD PRESSURE: 150 MMHG | DIASTOLIC BLOOD PRESSURE: 68 MMHG | DIASTOLIC BLOOD PRESSURE: 63 MMHG | OXYGEN SATURATION: 99 % | HEART RATE: 84 BPM | DIASTOLIC BLOOD PRESSURE: 72 MMHG | DIASTOLIC BLOOD PRESSURE: 67 MMHG | SYSTOLIC BLOOD PRESSURE: 135 MMHG | SYSTOLIC BLOOD PRESSURE: 99 MMHG | RESPIRATION RATE: 14 BRPM | SYSTOLIC BLOOD PRESSURE: 108 MMHG | SYSTOLIC BLOOD PRESSURE: 133 MMHG | SYSTOLIC BLOOD PRESSURE: 114 MMHG | HEART RATE: 99 BPM | HEART RATE: 81 BPM | DIASTOLIC BLOOD PRESSURE: 64 MMHG | WEIGHT: 152 LBS | HEART RATE: 76 BPM

## 2025-04-10 DIAGNOSIS — R19.7 DIARRHEA, UNSPECIFIED: ICD-10-CM

## 2025-04-10 DIAGNOSIS — K63.5 POLYP OF COLON: ICD-10-CM

## 2025-04-10 LAB
GI HISTOLOGY: A. SIGMOID COLON: (no result)
GI HISTOLOGY: B. WHOLE COLON: (no result)
GI HISTOLOGY: PDF REPORT: (no result)

## 2025-04-10 PROCEDURE — 45385 COLONOSCOPY W/LESION REMOVAL: CPT | Performed by: INTERNAL MEDICINE

## 2025-04-10 PROCEDURE — 45380 COLONOSCOPY AND BIOPSY: CPT | Mod: 59 | Performed by: INTERNAL MEDICINE
